# Patient Record
Sex: MALE | Race: OTHER | HISPANIC OR LATINO | Employment: FULL TIME | URBAN - METROPOLITAN AREA
[De-identification: names, ages, dates, MRNs, and addresses within clinical notes are randomized per-mention and may not be internally consistent; named-entity substitution may affect disease eponyms.]

---

## 2017-07-12 ENCOUNTER — TELEPHONE (OUTPATIENT)
Dept: SLEEP MEDICINE | Facility: CLINIC | Age: 27
End: 2017-07-12

## 2017-07-12 ENCOUNTER — OFFICE VISIT (OUTPATIENT)
Dept: SLEEP MEDICINE | Facility: CLINIC | Age: 27
End: 2017-07-12
Payer: COMMERCIAL

## 2017-07-12 ENCOUNTER — LAB VISIT (OUTPATIENT)
Dept: LAB | Facility: OTHER | Age: 27
End: 2017-07-12
Attending: NURSE PRACTITIONER
Payer: COMMERCIAL

## 2017-07-12 VITALS
HEART RATE: 68 BPM | BODY MASS INDEX: 41.75 KG/M2 | DIASTOLIC BLOOD PRESSURE: 90 MMHG | SYSTOLIC BLOOD PRESSURE: 150 MMHG | WEIGHT: 315 LBS | HEIGHT: 73 IN | OXYGEN SATURATION: 97 %

## 2017-07-12 DIAGNOSIS — R74.01 ELEVATED ALANINE AMINOTRANSFERASE (ALT) LEVEL: Primary | ICD-10-CM

## 2017-07-12 DIAGNOSIS — G47.33 OBSTRUCTIVE SLEEP APNEA: Primary | ICD-10-CM

## 2017-07-12 DIAGNOSIS — I10 ESSENTIAL HYPERTENSION: ICD-10-CM

## 2017-07-12 DIAGNOSIS — E66.01 OBESITY, MORBID, BMI 50 OR HIGHER: ICD-10-CM

## 2017-07-12 DIAGNOSIS — F17.200 CURRENT EVERY DAY SMOKER: ICD-10-CM

## 2017-07-12 DIAGNOSIS — I10 ESSENTIAL HYPERTENSION: Primary | ICD-10-CM

## 2017-07-12 DIAGNOSIS — R74.01 ELEVATED ALANINE AMINOTRANSFERASE (ALT) LEVEL: ICD-10-CM

## 2017-07-12 DIAGNOSIS — G47.33 OBSTRUCTIVE SLEEP APNEA: ICD-10-CM

## 2017-07-12 LAB
ALBUMIN SERPL BCP-MCNC: 3.7 G/DL
ALP SERPL-CCNC: 54 U/L
ALT SERPL W/O P-5'-P-CCNC: 69 U/L
ANION GAP SERPL CALC-SCNC: 9 MMOL/L
AST SERPL-CCNC: 34 U/L
BASOPHILS # BLD AUTO: 0.01 K/UL
BASOPHILS NFR BLD: 0.1 %
BILIRUB SERPL-MCNC: 0.8 MG/DL
BUN SERPL-MCNC: 13 MG/DL
CALCIUM SERPL-MCNC: 9 MG/DL
CHLORIDE SERPL-SCNC: 104 MMOL/L
CO2 SERPL-SCNC: 26 MMOL/L
CREAT SERPL-MCNC: 0.8 MG/DL
DIFFERENTIAL METHOD: NORMAL
EOSINOPHIL # BLD AUTO: 0.2 K/UL
EOSINOPHIL NFR BLD: 1.6 %
ERYTHROCYTE [DISTWIDTH] IN BLOOD BY AUTOMATED COUNT: 13 %
EST. GFR  (AFRICAN AMERICAN): >60 ML/MIN/1.73 M^2
EST. GFR  (NON AFRICAN AMERICAN): >60 ML/MIN/1.73 M^2
GLUCOSE SERPL-MCNC: 91 MG/DL
HCT VFR BLD AUTO: 45.7 %
HGB BLD-MCNC: 14.7 G/DL
LYMPHOCYTES # BLD AUTO: 2.5 K/UL
LYMPHOCYTES NFR BLD: 24.4 %
MCH RBC QN AUTO: 28.2 PG
MCHC RBC AUTO-ENTMCNC: 32.2 %
MCV RBC AUTO: 88 FL
MONOCYTES # BLD AUTO: 0.6 K/UL
MONOCYTES NFR BLD: 6 %
NEUTROPHILS # BLD AUTO: 7 K/UL
NEUTROPHILS NFR BLD: 67.6 %
PLATELET # BLD AUTO: 168 K/UL
PMV BLD AUTO: 10.7 FL
POTASSIUM SERPL-SCNC: 4.2 MMOL/L
PROT SERPL-MCNC: 7.3 G/DL
RBC # BLD AUTO: 5.22 M/UL
SODIUM SERPL-SCNC: 139 MMOL/L
TSH SERPL DL<=0.005 MIU/L-ACNC: 1.91 UIU/ML
WBC # BLD AUTO: 10.3 K/UL

## 2017-07-12 PROCEDURE — 99999 PR PBB SHADOW E&M-EST. PATIENT-LVL IV: CPT | Mod: PBBFAC,,, | Performed by: NURSE PRACTITIONER

## 2017-07-12 PROCEDURE — 84443 ASSAY THYROID STIM HORMONE: CPT

## 2017-07-12 PROCEDURE — 36415 COLL VENOUS BLD VENIPUNCTURE: CPT

## 2017-07-12 PROCEDURE — 85025 COMPLETE CBC W/AUTO DIFF WBC: CPT

## 2017-07-12 PROCEDURE — 80074 ACUTE HEPATITIS PANEL: CPT

## 2017-07-12 PROCEDURE — 80053 COMPREHEN METABOLIC PANEL: CPT

## 2017-07-12 PROCEDURE — 99205 OFFICE O/P NEW HI 60 MIN: CPT | Mod: S$GLB,,, | Performed by: NURSE PRACTITIONER

## 2017-07-12 RX ORDER — LISINOPRIL 20 MG/1
20 TABLET ORAL DAILY
Qty: 90 TABLET | Refills: 3 | Status: SHIPPED | OUTPATIENT
Start: 2017-07-12 | End: 2017-07-12 | Stop reason: SDUPTHER

## 2017-07-12 RX ORDER — LISINOPRIL 20 MG/1
20 TABLET ORAL DAILY
Qty: 30 TABLET | Refills: 0 | Status: SHIPPED | OUTPATIENT
Start: 2017-07-12 | End: 2018-02-01 | Stop reason: SDUPTHER

## 2017-07-12 NOTE — PROGRESS NOTES
Orville Erwin  was seen as a new patient, self-referred,  for the management of obstructive sleep apnea.    CHIEF COMPLAINT:    Chief Complaint   Patient presents with    Sleep Apnea       07/12/2017 SHAYNE Allen NP: HISTORY OF PRESENT ILLNESS: Orville Erwin is a 27 y.o. male is here for sleep evaluation.       Pt diagnosed with CHARLY in 2011 and has been using CPAP since.     Has breakthrough symptoms of disruptive snoring, excessive daytime sleepiness, excessive daytime fatigue, interrupted sleep, and morning headaches. Machine pressure does not feel sufficient.     CPAP Interrogation: Resmed Escape II Therapy hours: 2237 hours, Blower hours: 3488 CPAP 17 cm. No average hours information.     Reports weight gain since 2011 sleep study - 38 lb difference today's weight and 2011 weight.     Pt does not have PCP at this time, but is scheduled to see Dr. Cheng 7/24 to establish care. Has untreated uncontrolled hypertension. Has not seen doctor in 6 years.   Pt's BP is not controlled. Currently not taking any meds, but have previously been on BP meds; unable to recall which medication.  Pt denies cp/sob/ha/vision or neuro changes. Would like to resume therapy.     Denies symptoms of restless legs or kicking during sleep.    Occupation: works 3 jobs -  (on call only),  (few hours Fridays and Saturdays), and  full time    [place] Sleepiness Scale score during initial sleep evaluation was 15.    SLEEP ROUTINE:     Bed partner:  none  Time to bed:  midnight  Sleep onset latency:  Immediately         Disruptions or awakenings: 2 -3    Wakeup time:    4 am  Perceived sleep quality: poor        Baseline Sleep Study: 04/01/2011 Split night study 356 lb. The overall AHI was 107, worse in supine position and REM stage sleep, with an oxygen felipe of 25%. Supplemental oxygen was added at 2 liters/min during the diagnostic portion due to persistently low oxygen saturations that did  "not recover between CHARLY events.  Effective control of sleep disordered breathing was seen during supine REM stage sleep at a pressure of 17 cm of water.        PAST MEDICAL HISTORY:    Active Ambulatory Problems     Diagnosis Date Noted    No Active Ambulatory Problems     Resolved Ambulatory Problems     Diagnosis Date Noted    No Resolved Ambulatory Problems     Past Medical History:   Diagnosis Date    Hypertension     Obesity     Sleep apnea                 PAST SURGICAL HISTORY:    Past Surgical History:   Procedure Laterality Date    hypoxemia  2016    hospitalized for 4day    TONSILLECTOMY      as child         FAMILY HISTORY:                Family History   Problem Relation Age of Onset    Seizures Mother     Hypertension Father     Diabetes Paternal Grandmother     Constipation Daughter        SOCIAL HISTORY:          Tobacco:   History   Smoking Status    Current Every Day Smoker    Packs/day: 0.50    Types: Vaping with nicotine   Smokeless Tobacco    Former User       Alcohol use:    History   Alcohol Use No                 ALLERGIES:  Review of patient's allergies indicates:  No Known Allergies    CURRENT MEDICATIONS:    Current Outpatient Prescriptions   Medication Sig Dispense Refill    lisinopril (PRINIVIL,ZESTRIL) 20 MG tablet Take 1 tablet (20 mg total) by mouth once daily. 90 tablet 3     No current facility-administered medications for this visit.                   REVIEW OF SYSTEMS:     Sleep related symptoms as per HPI.  CONST:Reports weight gain    HEENT: Denies sinus congestion  PULM: Reports dyspnea  CARD:  Denies palpitations   GI:  Denies acid reflux  : Denies polyuria  NEURO: Reports headaches  PSYCH: Denies mood disturbance  HEME: Denies anemia    Otherwise, a balance of systems reviewed is negative.          PHYSICAL EXAM:  Vitals:    07/12/17 0750   BP: (!) 150/90   Pulse: 68   SpO2: 97%   Weight: (!) 179.1 kg (394 lb 13.5 oz)   Height: 6' 1" (1.854 m)   PainSc: 0-No " pain     Body mass index is 52.09 kg/m².     GENERAL: Obese body habitus, well groomed  HEENT:  Conjunctivae are non-erythematous; Pupils equal, round, and reactive to light; Nose is symmetrical; Nasal mucosa is pink and moist; Septum is midline; Inferior turbinates are normal; Nasal airflow is normal; Posterior pharynx is pink; Modified Mallampati: IV; Posterior palate is normal; Tonsils +1; Uvula is normal and pink;Tongue is normal; Dentition is fair; No TMJ tenderness; Jaw opening and protrusion without click and without discomfort.  NECK: Supple. No thyromegaly. No palpable nodes.    SKIN: On face and neck: No abrasions, no rashes, no lesions.  No subcutaneous nodules are palpable.  RESPIRATORY: Chest is clear to auscultation.  Normal chest expansion and non-labored breathing at rest.  CARDIOVASCULAR: Normal S1, S2.  No murmurs, gallops or rubs. No carotid bruits bilaterally.  EXTREMITIES: No edema. No clubbing. No cyanosis. Station normal. Gait normal.        NEURO/PSYCH: Oriented to time, place and person. Normal attention span and concentration. Affect is full. Mood is normal.                                              ASSESSMENT:    Obstructive sleep apnea, very severe by AHI. The patient symptomatically has breakthrough symptoms of disruptive snoring, excessive daytime sleepiness, excessive daytime fatigue, interrupted sleep, and morning headaches despite nightly use of CPAP with findings of crowded oral airway and elevated body mas index. Medical co-morbidities: uncontrolled HTN and obesity.  This warrants continued treatment for obstructive sleep apnea.  CPAP machine has reached reasonable useful lifetime and needs to be replaced.     Essential hypertension, chronic, uncontrolled    Morbid obesity, BMI 52.09, discussed relationship between CHARLY and weight    Current everyday smoker, vape with nicotine, not yet ready to quit    PLAN:    - CHARLY: new auto CPAP machine 15 - 20 cm; set up today at [facility]  at 10 am.  If patient has ongoing CHARLY symptoms despite CPAP adherence, then consider an in-lab titration sleep study in order to determine optimal fixed CPAP setting. RTC 6 weeks for CPAP adherence monitoring after set up.     -HTN : check labs today (CBC, CMP, TSH), start lisinopril 20 mg qd, and attend already scheduled appt to establish care with PCP: Dr. Cheng on 7/24 and re-check BP.  -Education: maintaining normal body weight (BMI 19 - 25)/ weight reduction. DASH diet. Low-sodium diet. Increase physical activity. Limit alcohol consumption. Smoking Cessation.     -Obesity: referral to Bariatric Medicine for weight reduction.  -Counseling regarding benefits of healthy eating and physical activity (150 minutes of moderate-intensity aerobic activity per week) for weight reduction which can improve overall health.      - Education: During our discussion today, we talked about the etiology of obstructive sleep apnea as well as the potential ramifications of untreated sleep apnea, which could include daytime sleepiness, hypertension, heart disease and/or stroke. We discussed potential treatment options, which could include weight loss, body positioning, continuous positive airway pressure (CPAP), or referral for surgical consideration.     - Precautions: The patient was advised to abstain from driving should they feel sleepy  or drowsy.

## 2017-07-12 NOTE — TELEPHONE ENCOUNTER
Holly, I have added a hepatitis panel to Mr. Erwin's lab from today. Please call the lab to add this on to today's specimen.

## 2017-07-12 NOTE — TELEPHONE ENCOUNTER
----- Message from Kirstin Yobani sent at 7/12/2017 11:44 AM CDT -----  x_  1st Request  _  2nd Request  _  3rd Request    Please refill the medication(s) listed below. Please call the patient when the prescription(s) is ready for  at the phone number (___)(___[phone]_____) . Pt stated that he would like his medication to be sent to another pharmacy. Information is below.    Medication #1lisinopril (PRINIVIL,ZESTRIL) 20 MG tablet 90 tablet 3     Medication #2      Preferred Pharmacy:  [facility]  [address] [place] [id number]

## 2017-07-12 NOTE — PATIENT INSTRUCTIONS
Your prescription for CPAP has been sent through our system. You should receive a call from [facility] in the next few days regarding your CPAP set up.     For any questions regarding your machine or supplies, please contact [facility] at [phone] (Ext [phone]).       To get CPAP machine today appointment at 10 am: For [facility]: CPAP and CPAP supplies    From Sleep Clinic: Take elevator to 2nd floor, take walkway to [facility], take [address] elevator to 7th floor; You will see sign for [facility]

## 2017-07-12 NOTE — TELEPHONE ENCOUNTER
Called [facility] to cancel pt order/ to transfer to [facility] and rep said that it was already sent to [facility] (the pt preferred pharmacy)    Pt was notified of Rx transfer.    Also called the lab to add on HEPATITIS PANEL to pt previous lab draw, spoke with 3 different people from [facility] lab they all said they did not see the order but when I called [facility] ([place] [phone]) they said they saw the order, but unable to assist me because the specimen was still at [facility].    [facility] lab suggested that NP changed order to lab collect where it says future so that they can used the order that they see but...unaccomplished.    It had already said lab collect somewhere on the order.

## 2017-07-13 ENCOUNTER — TELEPHONE (OUTPATIENT)
Dept: SLEEP MEDICINE | Facility: CLINIC | Age: 27
End: 2017-07-13

## 2017-07-13 LAB
HAV IGM SERPL QL IA: NEGATIVE
HBV CORE IGM SERPL QL IA: NEGATIVE
HBV SURFACE AG SERPL QL IA: NEGATIVE
HCV AB SERPL QL IA: NEGATIVE

## 2017-07-24 ENCOUNTER — LAB VISIT (OUTPATIENT)
Dept: LAB | Facility: OTHER | Age: 27
End: 2017-07-24
Attending: INTERNAL MEDICINE
Payer: COMMERCIAL

## 2017-07-24 ENCOUNTER — OFFICE VISIT (OUTPATIENT)
Dept: INTERNAL MEDICINE | Facility: CLINIC | Age: 27
End: 2017-07-24
Payer: COMMERCIAL

## 2017-07-24 VITALS
SYSTOLIC BLOOD PRESSURE: 130 MMHG | BODY MASS INDEX: 42.66 KG/M2 | HEART RATE: 80 BPM | WEIGHT: 315 LBS | HEIGHT: 72 IN | DIASTOLIC BLOOD PRESSURE: 72 MMHG | TEMPERATURE: 98 F | OXYGEN SATURATION: 97 %

## 2017-07-24 DIAGNOSIS — I10 ESSENTIAL HYPERTENSION: ICD-10-CM

## 2017-07-24 DIAGNOSIS — F17.200 NICOTINE USE DISORDER: ICD-10-CM

## 2017-07-24 DIAGNOSIS — M25.50 ARTHRALGIA, UNSPECIFIED JOINT: ICD-10-CM

## 2017-07-24 DIAGNOSIS — Z00.00 ANNUAL PHYSICAL EXAM: Primary | ICD-10-CM

## 2017-07-24 DIAGNOSIS — R51.9 RIGHT TEMPORAL HEADACHE: ICD-10-CM

## 2017-07-24 DIAGNOSIS — R79.89 ABNORMAL LFTS: ICD-10-CM

## 2017-07-24 PROBLEM — G47.33 OSA ON CPAP: Status: ACTIVE | Noted: 2017-07-24

## 2017-07-24 LAB
CCP AB SER IA-ACNC: <0.5 U/ML
CRP SERPL-MCNC: 13.5 MG/L
ERYTHROCYTE [SEDIMENTATION RATE] IN BLOOD BY WESTERGREN METHOD: 9 MM/HR
RHEUMATOID FACT SERPL-ACNC: <10 IU/ML

## 2017-07-24 PROCEDURE — 36415 COLL VENOUS BLD VENIPUNCTURE: CPT

## 2017-07-24 PROCEDURE — 99395 PREV VISIT EST AGE 18-39: CPT | Mod: S$GLB,,, | Performed by: INTERNAL MEDICINE

## 2017-07-24 PROCEDURE — 99999 PR PBB SHADOW E&M-EST. PATIENT-LVL IV: CPT | Mod: PBBFAC,,, | Performed by: INTERNAL MEDICINE

## 2017-07-24 PROCEDURE — 85651 RBC SED RATE NONAUTOMATED: CPT

## 2017-07-24 PROCEDURE — 86200 CCP ANTIBODY: CPT

## 2017-07-24 PROCEDURE — 84305 ASSAY OF SOMATOMEDIN: CPT

## 2017-07-24 PROCEDURE — 86431 RHEUMATOID FACTOR QUANT: CPT

## 2017-07-24 PROCEDURE — 86140 C-REACTIVE PROTEIN: CPT

## 2017-07-24 RX ORDER — IBUPROFEN 800 MG/1
800 TABLET ORAL 2 TIMES DAILY PRN
Qty: 30 TABLET | Refills: 0 | Status: SHIPPED | OUTPATIENT
Start: 2017-07-24 | End: 2018-05-08 | Stop reason: SDUPTHER

## 2017-07-24 NOTE — LETTER
July 24, 2017    Orville Erwin  [address] [place] [address]         [facility]  [facility]  [place] [address]  Phone: [phone]  Fax: [phone] July 24, 2017     Patient: Orville Erwin   YOB: 1990   Date of Visit: 7/24/2017       To Whom It May Concern:    Orville Erwin was seen in our office today.  .    If you have any questions or concerns, please don't hesitate to call.    Sincerely,        Erik Cheng MD

## 2017-07-24 NOTE — PROGRESS NOTES
Subjective:       Patient ID: Orville Erwin is a 27 y.o. male.    Chief Complaint: Annual Exam      New pt.     Here for physical exam.    Feeling well today.  No c/o.     Pt says 'I have been big my whole life.'  He is interested in losing weight but feels unmotivated.      ROS  -Occasional HA.  R temporal, 'piercing,' 2-3x/wk x 6 mos.  These improve w/Tylenol or sleep.  No photophobia or nausea.    -Elbows, shoulders and knees and back all feel very stiff each morning upon waking.  These feel better gradually after he gets moving around.  This has been present for approx 1yr.            Review of Systems   Constitutional: Negative.    HENT: Negative.    Eyes: Negative.    Respiratory: Negative.        Past Medical History:   Diagnosis Date    Hypertension     Obesity     Sleep apnea        Past Surgical History:   Procedure Laterality Date    hypoxemia  2016    hospitalized for 4day    TONSILLECTOMY      as child       Family History   Problem Relation Age of Onset    Seizures Mother     Hypertension Father     Diabetes Paternal Grandmother     Constipation Daughter     Alzheimer's disease Maternal Grandmother     Prostate cancer Maternal Grandfather        Social History     Social History    Marital status: Single     Spouse name: N/A    Number of children: N/A    Years of education: N/A     Occupational History          Social History Main Topics    Smoking status: Current Every Day Smoker     Packs/day: 0.50     Types: Vaping with nicotine    Smokeless tobacco: Former User    Alcohol use No    Drug use: No    Sexual activity: Yes     Partners: Female     Other Topics Concern    None     Social History Narrative    Lives w/wife and 1 child.       Objective:       Vitals:    07/24/17 1306   BP: 130/72   Pulse: 80   Temp: 97.7 °F (36.5 °C)   SpO2: 97%   Weight: (!) 179.5 kg (395 lb 11.6 oz)   Height: 6' (1.829 m)     Physical Exam   Constitutional: He is oriented to person,  place, and time. He appears well-developed and well-nourished. No distress.   ?frontal bossing   HENT:   Head: Normocephalic and atraumatic.   Right Ear: Tympanic membrane, external ear and ear canal normal.   Left Ear: Tympanic membrane, external ear and ear canal normal.   Mouth/Throat: Oropharynx is clear and moist and mucous membranes are normal. No oropharyngeal exudate or posterior oropharyngeal erythema.   Eyes: Conjunctivae and EOM are normal. Pupils are equal, round, and reactive to light.   Neck: Normal range of motion. Neck supple. No thyromegaly present.   Cardiovascular: Normal rate, regular rhythm and normal heart sounds.  Exam reveals no gallop and no friction rub.    No murmur heard.  Pulmonary/Chest: Effort normal and breath sounds normal. No respiratory distress. He has no wheezes. He has no rhonchi. He has no rales.   Lymphadenopathy:     He has no cervical adenopathy.   Neurological: He is alert and oriented to person, place, and time.   Skin: He is not diaphoretic.       Assessment:       1. Annual physical exam    2. Essential hypertension    3. Abnormal LFTs    4. BMI 50.0-59.9, adult    5. Nicotine use disorder    6. Right temporal headache    7. Arthralgia, unspecified joint        Plan:           HM - Update labs.    HTN - At goal.  Cont current tx.    Abnormal LFTs - I suspect NAFLD.  Repeat LFTs and check HBsAb.  Get U/S.      Obesity - Pt referred to [facility].    Nicotine - Pt advised to quit.  Pt aware of risks.  Pt not ready to quit.      HAs - Possibly migraine.  May also be 2/2 CHARLY.  Pt just got new CPAP supplies today.  Will see if HAs improve w/these.  Pt also was given ibu 800 for PRN use.    Athralgias - Will test for RA, acromegaly.

## 2017-07-26 LAB
IGF-I SERPL-MCNC: 152 NG/ML (ref 66–346)
IGF-I Z-SCORE SERPL: -0.39 SD

## 2017-08-01 ENCOUNTER — OFFICE VISIT (OUTPATIENT)
Dept: URGENT CARE | Facility: CLINIC | Age: 27
End: 2017-08-01
Payer: COMMERCIAL

## 2017-08-01 VITALS
WEIGHT: 315 LBS | HEIGHT: 73 IN | SYSTOLIC BLOOD PRESSURE: 131 MMHG | OXYGEN SATURATION: 98 % | DIASTOLIC BLOOD PRESSURE: 78 MMHG | TEMPERATURE: 98 F | BODY MASS INDEX: 41.75 KG/M2 | RESPIRATION RATE: 18 BRPM | HEART RATE: 102 BPM

## 2017-08-01 DIAGNOSIS — J01.40 ACUTE NON-RECURRENT PANSINUSITIS: Primary | ICD-10-CM

## 2017-08-01 DIAGNOSIS — R05.9 COUGH: ICD-10-CM

## 2017-08-01 PROCEDURE — 99203 OFFICE O/P NEW LOW 30 MIN: CPT | Mod: 25,S$GLB,, | Performed by: SURGERY

## 2017-08-01 PROCEDURE — 96372 THER/PROPH/DIAG INJ SC/IM: CPT | Mod: S$GLB,,, | Performed by: SURGERY

## 2017-08-01 RX ORDER — AZELASTINE 1 MG/ML
2 SPRAY, METERED NASAL 2 TIMES DAILY
Qty: 30 ML | Refills: 2 | Status: SHIPPED | OUTPATIENT
Start: 2017-08-01 | End: 2017-09-22

## 2017-08-01 RX ORDER — PREDNISONE 20 MG/1
20 TABLET ORAL 2 TIMES DAILY
Qty: 10 TABLET | Refills: 0 | Status: SHIPPED | OUTPATIENT
Start: 2017-08-01 | End: 2017-08-06

## 2017-08-01 RX ORDER — CEFUROXIME AXETIL 500 MG/1
500 TABLET ORAL 2 TIMES DAILY
Qty: 20 TABLET | Refills: 0 | Status: SHIPPED | OUTPATIENT
Start: 2017-08-01 | End: 2017-08-11

## 2017-08-01 RX ORDER — BETAMETHASONE SODIUM PHOSPHATE AND BETAMETHASONE ACETATE 3; 3 MG/ML; MG/ML
9 INJECTION, SUSPENSION INTRA-ARTICULAR; INTRALESIONAL; INTRAMUSCULAR; SOFT TISSUE ONCE
Status: COMPLETED | OUTPATIENT
Start: 2017-08-01 | End: 2017-08-01

## 2017-08-01 RX ORDER — PROMETHAZINE HYDROCHLORIDE AND CODEINE PHOSPHATE 6.25; 1 MG/5ML; MG/5ML
5 SOLUTION ORAL EVERY 4 HOURS PRN
Qty: 120 ML | Refills: 0 | Status: SHIPPED | OUTPATIENT
Start: 2017-08-01 | End: 2017-08-06

## 2017-08-01 RX ADMIN — BETAMETHASONE SODIUM PHOSPHATE AND BETAMETHASONE ACETATE 9 MG: 3; 3 INJECTION, SUSPENSION INTRA-ARTICULAR; INTRALESIONAL; INTRAMUSCULAR; SOFT TISSUE at 11:08

## 2017-08-01 NOTE — LETTER
August 1, 2017      [facility] [address], [address]  [place] [address]  Phone: [phone]  Fax: [phone]       Patient: Orville Erwin   YOB: 1990  Date of Visit: 08/01/2017    To Whom It May Concern:    Orville Rogel was at [facility] on 08/01/2017. He may return to work/school on 8/2/2017 with no restrictions. If you have any questions or concerns, or if I can be of further assistance, please do not hesitate to contact me.    Sincerely,    Priyanka Jeff MD

## 2017-08-01 NOTE — PATIENT INSTRUCTIONS
Sinusitis (Antibiotic Treatment)    The sinuses are air-filled spaces within the bones of the face. They connect to the inside of the nose. Sinusitis is an inflammation of the tissue lining the sinus cavity. Sinus inflammation can occur during a cold. It can also be due to allergies to pollens and other particles in the air. Sinusitis can cause symptoms of sinus congestion and fullness. A sinus infection causes fever, headache and facial pain. There is often green or yellow drainage from the nose or into the back of the throat (post-nasal drip). You have been given antibiotics to treat this condition.  Home care:  · Take the full course of antibiotics as instructed. Do not stop taking them, even if you feel better.  · Drink plenty of water, hot tea, and other liquids. This may help thin mucus. It also may promote sinus drainage.  · Heat may help soothe painful areas of the face. Use a towel soaked in hot water. Or,  the shower and direct the hot spray onto your face. Using a vaporizer along with a menthol rub at night may also help.   · An expectorant containing guaifenesin may help thin the mucus and promote drainage from the sinuses.  · Over-the-counter decongestants may be used unless a similar medicine was prescribed. Nasal sprays work the fastest. Use one that contains phenylephrine or oxymetazoline. First blow the nose gently. Then use the spray. Do not use these medicines more often than directed on the label or symptoms may get worse. You may also use tablets containing pseudoephedrine. Avoid products that combine ingredients, because side effects may be increased. Read labels. You can also ask the pharmacist for help. (NOTE: Persons with high blood pressure should not use decongestants. They can raise blood pressure.)  · Over-the-counter antihistamines may help if allergies contributed to your sinusitis.    · Do not use nasal rinses or irrigation during an acute sinus infection, unless told to by  your health care provider. Rinsing may spread the infection to other sinuses.  · Use acetaminophen or ibuprofen to control pain, unless another pain medicine was prescribed. (If you have chronic liver or kidney disease or ever had a stomach ulcer, talk with your doctor before using these medicines. Aspirin should never be used in anyone under 18 years of age who is ill with a fever. It may cause severe liver damage.)  · Don't smoke. This can worsen symptoms.  Follow-up care  Follow up with your healthcare provider or our staff if you are not improving within the next week.  When to seek medical advice  Call your healthcare provider if any of these occur:  · Facial pain or headache becoming more severe  · Stiff neck  · Unusual drowsiness or confusion  · Swelling of the forehead or eyelids  · Vision problems, including blurred or double vision  · Fever of 100.4ºF (38ºC) or higher, or as directed by your healthcare provider  · Seizure  · Breathing problems  · Symptoms not resolving within 10 days  Date Last Reviewed: 4/13/2015 © 2000-2016 [employer]. [address] [place] [address]. All rights reserved. This information is not intended as a substitute for professional medical care. Always follow your healthcare professional's instructions.      Please drink plenty of fluids.  Please get plenty of rest.  Please return here or go to the Emergency Department for any concerns or worsening of condition.  If you were given wait & see antibiotics, please wait 3-5 days before taking them, and only take them if your symptoms have worsened or not improved.  If you do begin taking the antibiotics, please take them to completion.  If you were prescribed antibiotics, please take them to completion.  If you were prescribed a narcotic medication, do not drive or operate heavy equipment or machinery while taking these medications.  If you do not have Hypertension or any history of palpitations, it is ok to  take over the counter Sudafed or Mucinex D or Allegra-D or Claritin-D or Zyrtec-D.  If you do take one of the above, it is ok to combine that with plain over the counter Mucinex or Allegra or Claritin or Zyrtec.  If for example you are taking Zyrtec -D, you can combine that with Mucinex, but not Mucinex-D.  If you are taking Mucinex-D, you can combine that with plain Allegra or Claritin or Zyrtec.   If you do have Hypertension or palpitations, it is safe to take Coricidin HBP for relief of sinus symptoms.  We recommend you take over the counter Flonase (Fluticasone) or another nasally inhaled steroid unless you are already taking one.  Nasal irrigation with a saline spray or Netti Pot like device per their directions is also recommended.  If not allergic, please take over the counter Tylenol (Acetaminophen) and/or Motrin (Ibuprofen) as directed for control of pain and/or fever.  Please follow up with your primary care doctor or specialist as needed.    If you  smoke, please stop smoking.

## 2017-08-01 NOTE — PROGRESS NOTES
"Subjective:       Patient ID: Orville Erwin is a 27 y.o. male.    Vitals:  height is 6' 1" (1.854 m) and weight is 179.2 kg (395 lb) (abnormal). His temperature is 98.3 °F (36.8 °C). His blood pressure is 131/78 and his pulse is 102. His respiration is 18 and oxygen saturation is 98%.     Chief Complaint: Cough and Sore Throat    Cough   This is a new problem. The current episode started in the past 7 days. The problem has been gradually worsening. The problem occurs constantly. The cough is productive of sputum. Associated symptoms include headaches, nasal congestion, postnasal drip, rhinorrhea and a sore throat. Pertinent negatives include no chest pain, chills, ear pain, eye redness, fever, myalgias, shortness of breath or wheezing. Nothing aggravates the symptoms. He has tried nothing for the symptoms. The treatment provided no relief.   Sore Throat    Associated symptoms include congestion, coughing and headaches. Pertinent negatives include no abdominal pain, ear pain, hoarse voice or shortness of breath.     Review of Systems   Constitution: Negative for chills, fever and malaise/fatigue.   HENT: Positive for congestion, headaches, postnasal drip, rhinorrhea and sore throat. Negative for ear pain and hoarse voice.    Eyes: Negative for discharge and redness.   Cardiovascular: Negative for chest pain, dyspnea on exertion and leg swelling.   Respiratory: Positive for cough and sputum production. Negative for shortness of breath and wheezing.    Musculoskeletal: Negative for myalgias.   Gastrointestinal: Negative for abdominal pain and nausea.       Objective:      Physical Exam   Constitutional: He is oriented to person, place, and time. He appears well-developed and well-nourished. He is cooperative.  Non-toxic appearance. He does not appear ill. No distress.   HENT:   Head: Normocephalic and atraumatic.   Right Ear: Hearing, tympanic membrane, external ear and ear canal normal.   Left Ear: Hearing, " tympanic membrane, external ear and ear canal normal.   Nose: Mucosal edema and rhinorrhea present. No nasal deformity. No epistaxis. Right sinus exhibits maxillary sinus tenderness and frontal sinus tenderness. Left sinus exhibits maxillary sinus tenderness and frontal sinus tenderness.   Mouth/Throat: Uvula is midline and mucous membranes are normal. No trismus in the jaw. Normal dentition. No uvula swelling. Posterior oropharyngeal edema and posterior oropharyngeal erythema present.   Purulent nasal discharge bilateral   Eyes: Conjunctivae and lids are normal. No scleral icterus.   Sclera clear bilat   Neck: Trachea normal, full passive range of motion without pain and phonation normal. Neck supple.   Cardiovascular: Normal rate, regular rhythm, normal heart sounds, intact distal pulses and normal pulses.    Pulmonary/Chest: Effort normal and breath sounds normal. No respiratory distress.   Frequent dry cough   Abdominal: Soft. Normal appearance and bowel sounds are normal. He exhibits no distension. There is no tenderness.   Musculoskeletal: Normal range of motion. He exhibits no edema or deformity.   Neurological: He is alert and oriented to person, place, and time. He exhibits normal muscle tone. Coordination normal.   Skin: Skin is warm, dry and intact. He is not diaphoretic. No pallor.   Psychiatric: He has a normal mood and affect. His speech is normal and behavior is normal. Judgment and thought content normal. Cognition and memory are normal.   Nursing note and vitals reviewed.      Assessment:       1. Acute non-recurrent pansinusitis    2. Cough        Plan:         Acute non-recurrent pansinusitis  -     betamethasone acetate-betamethasone sodium phosphate injection 9 mg; Inject 1.5 mLs (9 mg total) into the muscle once.  -     predniSONE (DELTASONE) 20 MG tablet; Take 1 tablet (20 mg total) by mouth 2 (two) times daily.  Dispense: 10 tablet; Refill: 0  -     cefUROXime (CEFTIN) 500 MG tablet; Take 1  tablet (500 mg total) by mouth 2 (two) times daily.  Dispense: 20 tablet; Refill: 0  -     promethazine-codeine 6.25-10 mg/5 ml (PHENERGAN WITH CODEINE) 6.25-10 mg/5 mL syrup; Take 5 mLs by mouth every 4 (four) hours as needed for Cough.  Dispense: 120 mL; Refill: 0  -     azelastine (ASTELIN) 137 mcg (0.1 %) nasal spray; 2 sprays (274 mcg total) by Nasal route 2 (two) times daily.  Dispense: 30 mL; Refill: 2    Cough        See patient education handouts.      Work note written

## 2017-09-21 DIAGNOSIS — R05.9 COUGH: Primary | ICD-10-CM

## 2017-09-22 ENCOUNTER — HOSPITAL ENCOUNTER (OUTPATIENT)
Dept: PULMONOLOGY | Facility: CLINIC | Age: 27
Discharge: HOME | End: 2017-09-22
Payer: COMMERCIAL

## 2017-09-22 ENCOUNTER — OFFICE VISIT (OUTPATIENT)
Dept: PULMONOLOGY | Facility: CLINIC | Age: 27
End: 2017-09-22
Payer: COMMERCIAL

## 2017-09-22 ENCOUNTER — HOSPITAL ENCOUNTER (OUTPATIENT)
Dept: RADIOLOGY | Facility: HOSPITAL | Age: 27
Discharge: HOME | End: 2017-09-22
Attending: NURSE PRACTITIONER
Payer: COMMERCIAL

## 2017-09-22 VITALS
HEART RATE: 76 BPM | OXYGEN SATURATION: 96 % | WEIGHT: 315 LBS | DIASTOLIC BLOOD PRESSURE: 98 MMHG | BODY MASS INDEX: 44.1 KG/M2 | SYSTOLIC BLOOD PRESSURE: 142 MMHG | HEIGHT: 71 IN

## 2017-09-22 DIAGNOSIS — G47.33 OSA ON CPAP: ICD-10-CM

## 2017-09-22 DIAGNOSIS — R05.9 COUGH: ICD-10-CM

## 2017-09-22 DIAGNOSIS — F17.200 NICOTINE USE DISORDER: Primary | ICD-10-CM

## 2017-09-22 DIAGNOSIS — J45.20 MILD INTERMITTENT ASTHMA IN ADULT WITHOUT COMPLICATION: ICD-10-CM

## 2017-09-22 LAB
POST FEV1 FVC: 0.76
POST FEV1: 2.94
POST FVC: 3.86
PRE FEV1 FVC: 73
PRE FEV1: 2.63
PRE FVC: 3.58
PREDICTED FEV1 FVC: 84
PREDICTED FEV1: 4.84
PREDICTED FVC: 5.6

## 2017-09-22 PROCEDURE — 94060 EVALUATION OF WHEEZING: CPT | Mod: S$GLB,,, | Performed by: INTERNAL MEDICINE

## 2017-09-22 PROCEDURE — 71020 XR CHEST PA AND LATERAL: CPT | Mod: TC

## 2017-09-22 PROCEDURE — 99999 PR PBB SHADOW E&M-EST. PATIENT-LVL III: CPT | Mod: PBBFAC,,, | Performed by: NURSE PRACTITIONER

## 2017-09-22 PROCEDURE — 99214 OFFICE O/P EST MOD 30 MIN: CPT | Mod: 25,S$GLB,, | Performed by: NURSE PRACTITIONER

## 2017-09-22 PROCEDURE — 94727 GAS DIL/WSHOT DETER LNG VOL: CPT | Mod: 51,S$GLB,, | Performed by: INTERNAL MEDICINE

## 2017-09-22 PROCEDURE — 3008F BODY MASS INDEX DOCD: CPT | Mod: S$GLB,,, | Performed by: NURSE PRACTITIONER

## 2017-09-22 PROCEDURE — 71020 XR CHEST PA AND LATERAL: CPT | Mod: 26,,, | Performed by: RADIOLOGY

## 2017-09-22 PROCEDURE — 94729 DIFFUSING CAPACITY: CPT | Mod: S$GLB,,, | Performed by: INTERNAL MEDICINE

## 2017-09-22 RX ORDER — ALBUTEROL SULFATE 90 UG/1
2 AEROSOL, METERED RESPIRATORY (INHALATION) EVERY 4 HOURS PRN
Qty: 18 G | Refills: 3 | Status: SHIPPED | OUTPATIENT
Start: 2017-09-22 | End: 2018-05-08 | Stop reason: SDUPTHER

## 2017-09-22 RX ORDER — FLUTICASONE PROPIONATE 50 MCG
2 SPRAY, SUSPENSION (ML) NASAL DAILY
Qty: 1 BOTTLE | Refills: 3 | Status: SHIPPED | OUTPATIENT
Start: 2017-09-22 | End: 2018-05-09 | Stop reason: SDUPTHER

## 2017-09-22 RX ORDER — FLUTICASONE FUROATE AND VILANTEROL 200; 25 UG/1; UG/1
1 POWDER RESPIRATORY (INHALATION) DAILY
Qty: 1 EACH | Refills: 5 | Status: SHIPPED | OUTPATIENT
Start: 2017-09-22 | End: 2018-05-09

## 2017-09-22 RX ORDER — MONTELUKAST SODIUM 10 MG/1
10 TABLET ORAL NIGHTLY
Qty: 30 TABLET | Refills: 3 | Status: SHIPPED | OUTPATIENT
Start: 2017-09-22 | End: 2017-10-22

## 2017-09-22 NOTE — PROGRESS NOTES
Subjective:       Patient ID: Orville Erwin is a 27 y.o. male.    Chief Complaint: Abnormal PFTs    HPI  Orville Erwin is a 27 y.o. male who presents today as new patient for pulmonary evaluation work clearance. His medical hx includes HTN, obesity, and obstructive sleep apnea (compliant with CPAP therapy). He has a hx of asthma dx as a child but has not been symptomatic. He was on a daily controller as a child but did not have a need as he grew older. He has seasonal allergic rhinitis with post nasal drainage with associated non productive cough. He uses zyrtec and it helps. He had an abnormal lung function at work and is here for clearance.  He has a 5 pack year smoking history but stopped smoking cigarettes and started to use a vape (occasionally). No family hx lung cancer or disease. 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 He denies shortness of breath, wheezing, BROWN, chest pain, fever, or chills.    Review of patient's allergies indicates:  No Known Allergies  Past Medical History:   Diagnosis Date    Hypertension     Obesity     Sleep apnea      Past Surgical History:   Procedure Laterality Date    hypoxemia  2016    hospitalized for 4day    TONSILLECTOMY      as child     Family History     Problem Relation (Age of Onset)    Alzheimer's disease Maternal Grandmother    Constipation Daughter    Diabetes Paternal Grandmother    Hypertension Father    Prostate cancer Maternal Grandfather    Seizures Mother        Social History Main Topics    Smoking status: Current Every Day Smoker     Packs/day: 0.50     Types: Vaping with nicotine    Smokeless tobacco: Former User    Alcohol use No    Drug use: No    Sexual activity: Yes     Partners: Female       Review of Systems   Constitutional: Negative for fever, chills and weight loss.   HENT: Positive for postnasal drip. Negative for trouble swallowing.    Eyes: Positive for itching.  "  Respiratory: Positive for cough. Negative for sputum production, shortness of breath and use of rescue inhaler.    Cardiovascular: Negative for chest pain and leg swelling.   Genitourinary: Negative for difficulty urinating.   Musculoskeletal: Negative for gait problem.   Skin: Negative for rash.   Gastrointestinal: Negative for acid reflux.   Neurological: Negative for headaches.   Psychiatric/Behavioral: Negative for sleep disturbance.       Objective:       Vitals:    09/22/17 1313   BP: (!) 142/98   Pulse: 76   SpO2: 96%   Weight: (!) 173.7 kg (383 lb)   Height: 5' 11" (1.803 m)     Physical Exam   Constitutional: He is oriented to person, place, and time. He appears well-developed. No distress. He is obese.   HENT:   Head: Normocephalic.   Cardiovascular: Normal rate, regular rhythm, normal heart sounds and intact distal pulses.    Pulmonary/Chest: Normal expansion, symmetric chest wall expansion, effort normal and breath sounds normal. No respiratory distress. He has no wheezes. He has no rhonchi. He has no rales.   Musculoskeletal: Normal range of motion. He exhibits no edema.   Lymphadenopathy: No supraclavicular adenopathy is present.     He has no cervical adenopathy.   Neurological: He is alert and oriented to person, place, and time.   Skin: Skin is warm and dry.   Psychiatric: He has a normal mood and affect.   Vitals reviewed.    Personal Diagnostic Review    PFT's reviewed 9/22/17:  small airway obstruction with significant improvement post bronchodilator. Normal DLCO  CXR reviewed 9/22/17: lung clear, no effusions  Assessment:          Outpatient Encounter Prescriptions as of 9/22/2017   Medication Sig Dispense Refill    lisinopril (PRINIVIL,ZESTRIL) 20 MG tablet Take 1 tablet (20 mg total) by mouth once daily. 30 tablet 0    albuterol 90 mcg/actuation inhaler Inhale 2 puffs into the lungs every 4 (four) hours as needed for Wheezing or Shortness of Breath. Rescue 18 g 3    fluticasone (FLONASE) " 50 mcg/actuation nasal spray 2 sprays by Each Nare route once daily. 1 Bottle 3    fluticasone-vilanterol (BREO ELLIPTA) 200-25 mcg/dose DsDv diskus inhaler Inhale 1 puff into the lungs once daily. Controller 1 each 5    ibuprofen (ADVIL,MOTRIN) 800 MG tablet Take 1 tablet (800 mg total) by mouth 2 (two) times daily as needed (headache). 30 tablet 0    montelukast (SINGULAIR) 10 mg tablet Take 1 tablet (10 mg total) by mouth every evening. 30 tablet 3    [DISCONTINUED] azelastine (ASTELIN) 137 mcg (0.1 %) nasal spray 2 sprays (274 mcg total) by Nasal route 2 (two) times daily. 30 mL 2     No facility-administered encounter medications on file as of 9/22/2017.      Problem List Items Addressed This Visit        Pulmonary    CHARLY on CPAP       Endocrine    BMI 50.0-59.9, adult       Other    Nicotine use disorder - Primary      Other Visit Diagnoses     Mild intermittent asthma in adult without complication              Plan:       · Upon review of pulmonary function studies and clinical history patient appears to have mild intermittent asthma which shows significant response to bronchodilators. Patient is asymptomatic however will place on daily  as a preventative.   · Take albuterol for rescue and/or prevention  · Advised to stop use of nicotine products such as cigarettes, vapes, ect.  Follow up in 3 months for asthma  Work clearance letter faxed today to work place [phone] and sent to patient in mail.  Contact prior to if any issues or concerns should arise.   Patient verbalized understanding of all information, instruction, education, recommendations provided and agrees with plan of care.

## 2017-09-22 NOTE — PATIENT INSTRUCTIONS
· Take Breo 1 puff daily and rinse mouth after every use.  · Take albuterol 2 puffs every 4 hours as needed for rescue or prevention  Nasal Saline:  Use distilled water only.  Be sure to tilt head forward as shown in picture.Squirt into nostril 2-3 times until you taste saline in back of throat. Spit, and blow nose. Do this 4 times, alternating right and left nostril. Do this routine 2 times per day.      Gargle with warm salt water 2 times per day.   Take singulair daily  Flonase 2 sprays each nostril daily  Continue all of these things for 2 weeks  Follow up in 3 months  Contact prior to if any issues or concerns should arise.

## 2017-09-27 ENCOUNTER — TELEPHONE (OUTPATIENT)
Dept: PULMONOLOGY | Facility: CLINIC | Age: 27
End: 2017-09-27

## 2017-09-27 NOTE — TELEPHONE ENCOUNTER
Patient presented in [facility] today stating he needed to see me today with regards to his pulmonary clinic letter. There was no appointment slots available, therefore I called patient and he stated he needed his clearance letter stating he passed his breathing studies. I informed patient that I faxed his letter for no work restriction to the number provided as well as the MA and mailed a copy to him on Friday. I printed and signed a copy of the letter today and the MA presented to him.

## 2017-11-28 DIAGNOSIS — I10 ESSENTIAL HYPERTENSION: ICD-10-CM

## 2017-11-29 RX ORDER — LISINOPRIL 20 MG/1
TABLET ORAL
Qty: 90 TABLET | Refills: 0 | OUTPATIENT
Start: 2017-11-29

## 2018-02-01 ENCOUNTER — LAB VISIT (OUTPATIENT)
Dept: LAB | Facility: HOSPITAL | Age: 28
End: 2018-02-01
Attending: INTERNAL MEDICINE
Payer: COMMERCIAL

## 2018-02-01 ENCOUNTER — OFFICE VISIT (OUTPATIENT)
Dept: INTERNAL MEDICINE | Facility: CLINIC | Age: 28
End: 2018-02-01
Payer: COMMERCIAL

## 2018-02-01 VITALS
HEIGHT: 73 IN | HEART RATE: 62 BPM | DIASTOLIC BLOOD PRESSURE: 100 MMHG | WEIGHT: 315 LBS | BODY MASS INDEX: 41.75 KG/M2 | SYSTOLIC BLOOD PRESSURE: 199 MMHG

## 2018-02-01 DIAGNOSIS — R10.13 EPIGASTRIC ABDOMINAL PAIN: Primary | ICD-10-CM

## 2018-02-01 DIAGNOSIS — I10 ESSENTIAL HYPERTENSION: ICD-10-CM

## 2018-02-01 DIAGNOSIS — R10.13 EPIGASTRIC ABDOMINAL PAIN: ICD-10-CM

## 2018-02-01 LAB
ANION GAP SERPL CALC-SCNC: 10 MMOL/L
BASOPHILS # BLD AUTO: 0.02 K/UL
BASOPHILS NFR BLD: 0.2 %
BUN SERPL-MCNC: 7 MG/DL
CALCIUM SERPL-MCNC: 9.1 MG/DL
CHLORIDE SERPL-SCNC: 104 MMOL/L
CO2 SERPL-SCNC: 24 MMOL/L
CREAT SERPL-MCNC: 0.8 MG/DL
DIFFERENTIAL METHOD: ABNORMAL
EOSINOPHIL # BLD AUTO: 0.1 K/UL
EOSINOPHIL NFR BLD: 1 %
ERYTHROCYTE [DISTWIDTH] IN BLOOD BY AUTOMATED COUNT: 12.8 %
EST. GFR  (AFRICAN AMERICAN): >60 ML/MIN/1.73 M^2
EST. GFR  (NON AFRICAN AMERICAN): >60 ML/MIN/1.73 M^2
GLUCOSE SERPL-MCNC: 163 MG/DL
HCT VFR BLD AUTO: 44.8 %
HGB BLD-MCNC: 14.7 G/DL
LYMPHOCYTES # BLD AUTO: 2.2 K/UL
LYMPHOCYTES NFR BLD: 18.8 %
MCH RBC QN AUTO: 28.1 PG
MCHC RBC AUTO-ENTMCNC: 32.8 G/DL
MCV RBC AUTO: 86 FL
MONOCYTES # BLD AUTO: 0.5 K/UL
MONOCYTES NFR BLD: 4.6 %
NEUTROPHILS # BLD AUTO: 8.7 K/UL
NEUTROPHILS NFR BLD: 75.1 %
PLATELET # BLD AUTO: 178 K/UL
PMV BLD AUTO: 11.4 FL
POTASSIUM SERPL-SCNC: 4 MMOL/L
RBC # BLD AUTO: 5.23 M/UL
SODIUM SERPL-SCNC: 138 MMOL/L
WBC # BLD AUTO: 11.6 K/UL

## 2018-02-01 PROCEDURE — 85025 COMPLETE CBC W/AUTO DIFF WBC: CPT

## 2018-02-01 PROCEDURE — 3008F BODY MASS INDEX DOCD: CPT | Mod: S$GLB,,, | Performed by: INTERNAL MEDICINE

## 2018-02-01 PROCEDURE — 99999 PR PBB SHADOW E&M-EST. PATIENT-LVL III: CPT | Mod: PBBFAC,,, | Performed by: INTERNAL MEDICINE

## 2018-02-01 PROCEDURE — 80048 BASIC METABOLIC PNL TOTAL CA: CPT

## 2018-02-01 PROCEDURE — 99213 OFFICE O/P EST LOW 20 MIN: CPT | Mod: S$GLB,,, | Performed by: INTERNAL MEDICINE

## 2018-02-01 PROCEDURE — 36415 COLL VENOUS BLD VENIPUNCTURE: CPT

## 2018-02-01 RX ORDER — LISINOPRIL 20 MG/1
20 TABLET ORAL DAILY
Qty: 30 TABLET | Refills: 0 | Status: SHIPPED | OUTPATIENT
Start: 2018-02-01 | End: 2020-03-02

## 2018-02-01 NOTE — LETTER
February 1, 2018    Orville Erwin  [address] [place] [address]          [facility]  [address]  [place] [address]  Phone: [phone]  Fax: [phone] February 1, 2018     Patient: Orville Erwin   YOB: 1990   Date of Visit: 2/1/2018       To Whom It May Concern:    It is my medical opinion that Orville Erwin may return to work on 2/2/18.  Was off due to medical conditions from 1/30/18 unitl 2/2..    If you have any questions or concerns, please don't hesitate to call.    Sincerely,        German Dsouza MD

## 2018-02-01 NOTE — PROGRESS NOTES
Clinic Note  2/1/2018      Subjective:       Patient ID:  Orville is a 27 y.o. male being seen for an urgent care visit.    Chief Complaint: Follow-up (er ) and Abdominal Pain    Abdominal Pain   This is a new problem. The current episode started yesterday. The onset quality is sudden. The problem occurs intermittently. The problem has been waxing and waning. The pain is located in the epigastric region. The pain is mild. The quality of the pain is cramping. The abdominal pain does not radiate. Associated symptoms include nausea and vomiting (once). Pertinent negatives include no constipation, diarrhea or fever. The pain is aggravated by eating.       Review of Systems   Constitutional: Negative for fever.   Gastrointestinal: Positive for abdominal pain, nausea and vomiting (once). Negative for constipation and diarrhea.       Medication List with Changes/Refills   New Medications    DICYCLOMINE HCL (GI COCKTAIL SIMPLE RECORD)    Take 15 mLs by mouth every 8 (eight) hours as needed.   Current Medications    ALBUTEROL 90 MCG/ACTUATION INHALER    Inhale 2 puffs into the lungs every 4 (four) hours as needed for Wheezing or Shortness of Breath. Rescue    FLUTICASONE (FLONASE) 50 MCG/ACTUATION NASAL SPRAY    2 sprays by Each Nare route once daily.    FLUTICASONE-VILANTEROL (BREO ELLIPTA) 200-25 MCG/DOSE DSDV DISKUS INHALER    Inhale 1 puff into the lungs once daily. Controller    IBUPROFEN (ADVIL,MOTRIN) 800 MG TABLET    Take 1 tablet (800 mg total) by mouth 2 (two) times daily as needed (headache).   Changed and/or Refilled Medications    Modified Medication Previous Medication    LISINOPRIL (PRINIVIL,ZESTRIL) 20 MG TABLET lisinopril (PRINIVIL,ZESTRIL) 20 MG tablet       Take 1 tablet (20 mg total) by mouth once daily.    Take 1 tablet (20 mg total) by mouth once daily.       Patient Active Problem List   Diagnosis    Essential hypertension    BMI 50.0-59.9, adult    CHARLY on CPAP    Nicotine use disorder    Cough  "          Objective:      BP (!) 199/100   Pulse 62   Ht 6' 1" (1.854 m)   Wt (!) 174.5 kg (384 lb 11.2 oz)   BMI 50.76 kg/m²   Estimated body mass index is 50.76 kg/m² as calculated from the following:    Height as of this encounter: 6' 1" (1.854 m).    Weight as of this encounter: 174.5 kg (384 lb 11.2 oz).  Physical Exam   Constitutional: He is oriented to person, place, and time and well-developed, well-nourished, and in no distress.   Cardiovascular: Normal rate and regular rhythm.    Pulmonary/Chest: Breath sounds normal.   Abdominal: Soft. There is no hepatosplenomegaly. There is tenderness in the epigastric area. There is no rigidity, no rebound, no guarding, no CVA tenderness, no tenderness at McBurney's point and negative Calvin's sign.   Musculoskeletal: He exhibits no edema.   Neurological: He is alert and oriented to person, place, and time.         Assessment and Plan:         Problem List Items Addressed This Visit     Essential hypertension - out of x 3 wks.  Refilled.  BP up today but no HA or CP.    Relevant Medications    lisinopril (PRINIVIL,ZESTRIL) 20 MG tablet      Other Visit Diagnoses     Epigastric abdominal pain    -  Primary - suspect gastric pain.  Recent abx but no diarrhea.  Able to keep po down.  Will check labs and give GI cocktail.  Has ondansetron for nausea from [facility] ED.  Apparently had plain films there which were negative.    Relevant Medications    DICYCLOMINE HCL (GI COCKTAIL SIMPLE RECORD)    Other Relevant Orders    Basic metabolic panel    CBC auto differential          Follow Up:   Follow-up if symptoms worsen or fail to improve.        German Dsouza      "

## 2018-02-02 ENCOUNTER — TELEPHONE (OUTPATIENT)
Dept: INTERNAL MEDICINE | Facility: CLINIC | Age: 28
End: 2018-02-02

## 2018-02-02 NOTE — TELEPHONE ENCOUNTER
----- Message from German Dsouza MD sent at 2/2/2018 10:17 AM CST -----  Contact: self/[phone]  Not sure about 3rd medication.  GI cocktail is what was sent as second medication.  Should have gone through to same pharmacy but if didn't can you call in.    German Dsouza    ----- Message -----  From: Crystal Garcia  Sent: 2/1/2018  12:23 PM  To: German Dsouza MD    Pt is calling to speak with someone in the office in regards to the office visit he had on today. He states that the Doctor was supposed to send in 3 different medications. Only one was sent in which was the  lisinopril (PRINIVIL,ZESTRIL) 20 MG tablet. He is calling so that he can get a call back as soon as possible. If the office just wants to fill the medication pt would like it sent to [facility] on [address] [place] [address]  .  Please call and advise. ([phone])    Thank You

## 2018-02-12 DIAGNOSIS — R73.9 HYPERGLYCEMIA: Primary | ICD-10-CM

## 2018-02-12 NOTE — PROGRESS NOTES
Attached please find your results. The sugars were up a little.  Please come in to get a hemoglobin A1c reading.    German Dsouza

## 2018-04-09 ENCOUNTER — OFFICE VISIT (OUTPATIENT)
Dept: SLEEP MEDICINE | Facility: CLINIC | Age: 28
End: 2018-04-09
Payer: COMMERCIAL

## 2018-04-09 ENCOUNTER — TELEPHONE (OUTPATIENT)
Dept: SLEEP MEDICINE | Facility: CLINIC | Age: 28
End: 2018-04-09

## 2018-04-09 VITALS
DIASTOLIC BLOOD PRESSURE: 81 MMHG | BODY MASS INDEX: 41.75 KG/M2 | HEIGHT: 73 IN | HEART RATE: 80 BPM | SYSTOLIC BLOOD PRESSURE: 131 MMHG | WEIGHT: 315 LBS

## 2018-04-09 DIAGNOSIS — K11.7 XEROSTOMIA: ICD-10-CM

## 2018-04-09 DIAGNOSIS — J31.0 CPAP RHINITIS: ICD-10-CM

## 2018-04-09 DIAGNOSIS — G47.33 OSA ON CPAP: Primary | ICD-10-CM

## 2018-04-09 PROCEDURE — 99999 PR PBB SHADOW E&M-EST. PATIENT-LVL III: CPT | Mod: PBBFAC,,, | Performed by: NURSE PRACTITIONER

## 2018-04-09 PROCEDURE — 99214 OFFICE O/P EST MOD 30 MIN: CPT | Mod: S$GLB,,, | Performed by: NURSE PRACTITIONER

## 2018-04-09 NOTE — PROGRESS NOTES
Orville Erwin  was seen in follow-up for CHARLY management and CPAP equipment check.     CHIEF COMPLAINT:    Chief Complaint   Patient presents with    Sleep Apnea     INTERVAL HISTORY:    04/09/2018 SHAYNE Allen NP: Accompanied by wife. Since last clinic visit, pt has gotten set up with CPAP on 07/27/2017. Reports resolution of snoring, excessive daytime sleepiness, excessive daytime fatigue, interrupted sleep, and morning headaches with CPAP. Requires new mask since current one's cushion is cracked. Also reports that current AirFit F20 size medium does not fit well, when pt mouth breaths at night, mouth falls beyond mask; if continuing with same type, thinks he needs to size up. Denies pressure intolerance. Reports oral drying and nasal congestion - air flow decreased with limited mucus. Pt does not use humidity at all. ESS 15.     CPAP Interrogation: DreamStation APAP 15 - 20    Compliance Summary Days with Device Usage: 30 days Percentage of Days >=4 Hours: 90.0% Average Usage (Days Used): 5 hrs. 59 mins. 58 secs. Average Usage (All Days): 5 hrs. 59 mins. 58 secs.   Apnea Indices Average AHI: 3.2 Average OA Index: 0.6 Average CA Index: 0.1  Large Leak Average Time in Large Leak: 31 mins. 52 secs. Average % of Night in Large Leak: 8.9%   Periodic Breathing Average % of Night in PB: 1.0%  90%tile pressure: 17.5 cm       07/12/2017 SHAYNE Allen NP: Initial HISTORY OF PRESENT ILLNESS: Orville Ewrin is a 27 y.o. male is here for sleep evaluation.       Pt diagnosed with CHARLY in 2011 and has been using CPAP since.     Has breakthrough symptoms of disruptive snoring, excessive daytime sleepiness, excessive daytime fatigue, interrupted sleep, and morning headaches. Machine pressure does not feel sufficient.     CPAP Interrogation: Resmed Escape II Therapy hours: 2237 hours, Blower hours: 3488 CPAP 17 cm. No average hours information.     Reports weight gain since 2011 sleep study - 38 lb difference today's  weight and 2011 weight.     Pt does not have PCP at this time, but is scheduled to see Dr. Cheng 7/24 to establish care. Has untreated uncontrolled hypertension. Has not seen doctor in 6 years.   Pt's BP is not controlled. Currently not taking any meds, but have previously been on BP meds; unable to recall which medication.  Pt denies cp/sob/ha/vision or neuro changes. Would like to resume therapy.     Denies symptoms of restless legs or kicking during sleep.    Occupation: works 3 jobs -  (previously volunteer only, went to school and now officially a ),  (few hours Fridays and Saturdays), and  full time    Solon Sleepiness Scale score during initial sleep evaluation was 15.    SLEEP ROUTINE:     Bed partner:  none  Time to bed:  midnight  Sleep onset latency:  Immediately         Disruptions or awakenings: 2 -3    Wakeup time:    4 am  Perceived sleep quality: poor        Baseline Sleep Study: 04/01/2011 Split night study 356 lb. The overall AHI was 107, worse in supine position and REM stage sleep, with an oxygen felipe of 25%. Supplemental oxygen was added at 2 liters/min during the diagnostic portion due to persistently low oxygen saturations that did not recover between CHARLY events.  Effective control of sleep disordered breathing was seen during supine REM stage sleep at a pressure of 17 cm of water.        PAST MEDICAL HISTORY:    Active Ambulatory Problems     Diagnosis Date Noted    Essential hypertension 07/24/2017    BMI 50.0-59.9, adult 07/24/2017    CHARLY on CPAP 07/24/2017    Nicotine use disorder 07/24/2017    Cough      Resolved Ambulatory Problems     Diagnosis Date Noted    No Resolved Ambulatory Problems     Past Medical History:   Diagnosis Date    Hypertension     Obesity     Sleep apnea                 PAST SURGICAL HISTORY:    Past Surgical History:   Procedure Laterality Date    hypoxemia  2016    hospitalized for 4day     TONSILLECTOMY      as child         FAMILY HISTORY:                Family History   Problem Relation Age of Onset    Seizures Mother     Hypertension Father     Diabetes Paternal Grandmother     Constipation Daughter     Alzheimer's disease Maternal Grandmother     Prostate cancer Maternal Grandfather        SOCIAL HISTORY:          Tobacco:   History   Smoking Status    Current Every Day Smoker    Packs/day: 0.50    Types: Vaping with nicotine   Smokeless Tobacco    Former User       Alcohol use:    History   Alcohol Use No                 ALLERGIES:  Review of patient's allergies indicates:  No Known Allergies    CURRENT MEDICATIONS:    Current Outpatient Prescriptions   Medication Sig Dispense Refill    albuterol 90 mcg/actuation inhaler Inhale 2 puffs into the lungs every 4 (four) hours as needed for Wheezing or Shortness of Breath. Rescue 18 g 3    DICYCLOMINE HCL (GI COCKTAIL SIMPLE RECORD) Take 15 mLs by mouth every 8 (eight) hours as needed. 100 mL 0    fluticasone (FLONASE) 50 mcg/actuation nasal spray 2 sprays by Each Nare route once daily. 1 Bottle 3    fluticasone-vilanterol (BREO ELLIPTA) 200-25 mcg/dose DsDv diskus inhaler Inhale 1 puff into the lungs once daily. Controller 1 each 5    ibuprofen (ADVIL,MOTRIN) 800 MG tablet Take 1 tablet (800 mg total) by mouth 2 (two) times daily as needed (headache). 30 tablet 0    lisinopril (PRINIVIL,ZESTRIL) 20 MG tablet Take 1 tablet (20 mg total) by mouth once daily. 30 tablet 0     No current facility-administered medications for this visit.                   REVIEW OF SYSTEMS:     Sleep related symptoms as per HPI.  CONST:Reports weight gain    HEENT: Denies sinus congestion  PULM: Reports dyspnea  CARD:  Denies palpitations   GI:  Denies acid reflux  : Denies polyuria  NEURO: Reports headaches  PSYCH: Denies mood disturbance  HEME: Denies anemia    Otherwise, a balance of systems reviewed is negative.          PHYSICAL EXAM:  Vitals:     "04/09/18 1345   BP: 131/81   Pulse: 80   Weight: (!) 170.2 kg (375 lb 3.6 oz)   Height: 6' 1" (1.854 m)   PainSc: 0-No pain     Body mass index is 49.5 kg/m².     GENERAL: Obese body habitus, well groomed  HEENT:  Conjunctivae are non-erythematous; Pupils equal, round, and reactive to light; Nose is symmetrical; Nasal mucosa is pink and moist; Septum is midline; Inferior turbinates are normal; Nasal airflow is normal; Posterior pharynx is pink; Modified Mallampati: IV; Posterior palate is normal; Tonsils +1; Uvula is normal and pink;Tongue is normal; Dentition is fair; No TMJ tenderness; Jaw opening and protrusion without click and without discomfort.  NECK: Supple. No thyromegaly. No palpable nodes.    SKIN: On face and neck: No abrasions, no rashes, no lesions.  No subcutaneous nodules are palpable.  RESPIRATORY: Chest is clear to auscultation.  Normal chest expansion and non-labored breathing at rest.  CARDIOVASCULAR: Normal S1, S2.  No murmurs, gallops or rubs. No carotid bruits bilaterally.  EXTREMITIES: No edema. No clubbing. No cyanosis. Station normal. Gait normal.        NEURO/PSYCH: Oriented to time, place and person. Normal attention span and concentration. Affect is full. Mood is normal.                                              ASSESSMENT:    Obstructive sleep apnea, very severe by AHI. The patient symptomatically has breakthrough symptoms of disruptive snoring, excessive daytime sleepiness, excessive daytime fatigue, interrupted sleep, and morning headaches resolved with auto CPAP machine. The patient is adherent on CPAP and experiencing symptomatic benefit. Medical co-morbidities: HTN and obesity.  This warrants continued treatment for obstructive sleep apnea.      CPAP therapy side effects: CPAP rhinitis and xerostomia     Morbid obesity, BMI > 45, discussed relationship between CHARLY and weight; 19-lb weight loss since last visit due to working out a lot, but does not have formal exercise or diet " modification     Current everyday smoker, vape with nicotine, not yet ready to quit    PLAN:    Treatment:  - continue auto CPAP machine 15 - 20 cm  -Immediately go to [facility] to get replacement FFM and other supplies  -Start using humidification with heated tubing (rainout a concern to patient)  -Start daily dosing of Flonase  -Additional therapy for dry mouth, ensure proper hydration and start Biotene oral rinses  -Provided with Rx for SoClean machine, should pt want to purchase from online vendor   -RTC 12 months, sooner if needed    Counseling regarding benefits of healthy eating and physical activity (150 minutes of moderate-intensity aerobic activity per week) for weight reduction which can improve overall health.      Education: During our discussion today, we talked about the etiology of obstructive sleep apnea as well as the potential ramifications of untreated sleep apnea, which could include daytime sleepiness, hypertension, heart disease and/or stroke. We discussed potential treatment options, which could include weight loss, body positioning, continuous positive airway pressure (CPAP), or referral for surgical consideration.     Precautions: The patient was advised to abstain from driving should they feel sleepy  or drowsy.

## 2018-04-09 NOTE — LETTER
[facility]  [address]  [facility] [address]  Phone: [phone]       April 9, 2018       To Whom It May Concern:       Please excuse Mr. Erwin from work. He was seen in the Sleep Clinic today.     He may return to work on April 10, 2018.       For questions or concerns, please contact me.             Sincerely,          BITA Allen NP

## 2018-04-09 NOTE — TELEPHONE ENCOUNTER
----- Message from Nichole Jeffrey sent at 4/9/2018  9:18 AM CDT -----  Contact: pt      Name of Who is Calling: VITALIY MORALES [[id number]]      What is the request in detail: pt calling in regards to cpap mask not fitting.. Pt states he also have a crack in his mask.. Pt would like to speak with staff in regards to getting machine replaced..Please advise..      Can the clinic reply by [facility]: no      [facility] [phone]

## 2018-04-09 NOTE — TELEPHONE ENCOUNTER
Called patient back.  Pt stated he needs new machine because mask doesn't fit correctly and he is not receiving his supplies anymore.  Informed pt per Insurance guidelines he had to keep his standard f/u in order for insurance to pay for supplies.      Pt had cancelled 3 previous appts.    Pt wish to be seen today.      So scheduled pt for today at 1:40pm with Radha  Last office visit 7/12/17

## 2018-05-08 ENCOUNTER — TELEPHONE (OUTPATIENT)
Dept: SLEEP MEDICINE | Facility: CLINIC | Age: 28
End: 2018-05-08

## 2018-05-08 ENCOUNTER — OFFICE VISIT (OUTPATIENT)
Dept: INTERNAL MEDICINE | Facility: CLINIC | Age: 28
End: 2018-05-08
Payer: COMMERCIAL

## 2018-05-08 ENCOUNTER — HOSPITAL ENCOUNTER (OUTPATIENT)
Dept: RADIOLOGY | Facility: HOSPITAL | Age: 28
Discharge: HOME | End: 2018-05-08
Attending: STUDENT IN AN ORGANIZED HEALTH CARE EDUCATION/TRAINING PROGRAM
Payer: COMMERCIAL

## 2018-05-08 VITALS
SYSTOLIC BLOOD PRESSURE: 132 MMHG | HEIGHT: 73 IN | BODY MASS INDEX: 41.75 KG/M2 | HEART RATE: 81 BPM | WEIGHT: 315 LBS | OXYGEN SATURATION: 97 % | DIASTOLIC BLOOD PRESSURE: 70 MMHG

## 2018-05-08 DIAGNOSIS — M79.651 RIGHT THIGH PAIN: ICD-10-CM

## 2018-05-08 DIAGNOSIS — J45.20 MILD INTERMITTENT ASTHMA, UNSPECIFIED WHETHER COMPLICATED: ICD-10-CM

## 2018-05-08 DIAGNOSIS — M72.2 PLANTAR FASCIITIS OF LEFT FOOT: Primary | ICD-10-CM

## 2018-05-08 DIAGNOSIS — M25.572 ACUTE LEFT ANKLE PAIN: ICD-10-CM

## 2018-05-08 PROCEDURE — 99999 PR PBB SHADOW E&M-EST. PATIENT-LVL IV: CPT | Mod: PBBFAC,,, | Performed by: STUDENT IN AN ORGANIZED HEALTH CARE EDUCATION/TRAINING PROGRAM

## 2018-05-08 PROCEDURE — 3008F BODY MASS INDEX DOCD: CPT | Mod: CPTII,S$GLB,, | Performed by: STUDENT IN AN ORGANIZED HEALTH CARE EDUCATION/TRAINING PROGRAM

## 2018-05-08 PROCEDURE — 73630 X-RAY EXAM OF FOOT: CPT | Mod: 26,LT,, | Performed by: RADIOLOGY

## 2018-05-08 PROCEDURE — 99214 OFFICE O/P EST MOD 30 MIN: CPT | Mod: S$GLB,,, | Performed by: STUDENT IN AN ORGANIZED HEALTH CARE EDUCATION/TRAINING PROGRAM

## 2018-05-08 PROCEDURE — 3075F SYST BP GE 130 - 139MM HG: CPT | Mod: CPTII,S$GLB,, | Performed by: STUDENT IN AN ORGANIZED HEALTH CARE EDUCATION/TRAINING PROGRAM

## 2018-05-08 PROCEDURE — 3078F DIAST BP <80 MM HG: CPT | Mod: CPTII,S$GLB,, | Performed by: STUDENT IN AN ORGANIZED HEALTH CARE EDUCATION/TRAINING PROGRAM

## 2018-05-08 PROCEDURE — 73630 X-RAY EXAM OF FOOT: CPT | Mod: TC,LT

## 2018-05-08 RX ORDER — ALBUTEROL SULFATE 90 UG/1
2 AEROSOL, METERED RESPIRATORY (INHALATION) EVERY 4 HOURS PRN
Qty: 18 G | Refills: 3 | Status: SHIPPED | OUTPATIENT
Start: 2018-05-08 | End: 2020-03-02 | Stop reason: SDUPTHER

## 2018-05-08 RX ORDER — IBUPROFEN 800 MG/1
800 TABLET ORAL 2 TIMES DAILY PRN
Qty: 30 TABLET | Refills: 0 | Status: SHIPPED | OUTPATIENT
Start: 2018-05-08 | End: 2018-05-09 | Stop reason: SDUPTHER

## 2018-05-08 NOTE — TELEPHONE ENCOUNTER
Holly, please advise pt to contact [facility] to inquire about mask fitting for different type mask. I will update his Rx as needed, but this need needs to be addressed by [facility].
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
## 2018-05-08 NOTE — LETTER
May 8, 2018       [facility]  [address]  [facility] [address]  Phone: [phone]  Fax: [phone]       Patient: Orville Erwin   YOB: 1990  Date of Visit: 05/08/2018    To Whom It May Concern:    Abbey Erwin  was at [facility] on 05/08/2018. He may return to work on 5/10/18 with no restrictions. Should avoid walking / weightbearing 5/8 and 5/9. If any questions or concerns, or if I can be of further assistance, please do not hesitate to contact me.    Sincerely,        Adrián Johnson MD

## 2018-05-08 NOTE — TELEPHONE ENCOUNTER
----- Message from Dimple Hernandez sent at 5/8/2018 12:19 PM CDT -----  Contact: Pt  Name of Who is Calling: VITALIY MORALES [[id number]]    What is the request in detail: Patient states his current CPAP mask is not working and would like to get a new order for a different type... Please contact to further discuss and advise       Can the clinic reply by [facility]: No      [facility]: [phone]

## 2018-05-08 NOTE — PATIENT INSTRUCTIONS
Plantar Fasciitis  Plantar fasciitis is a painful swelling of the plantar fascia. The plantar fascia is a thick, fibrous layer of tissue. It covers the bones on the bottom of your foot. And it supports the foot bones in an arched position.  This can happen gradually or suddenly. It usually affects one foot at a time. Heel pain can be sharp, like a knife sticking into the bottom of your foot. You may feel pain after exercising, long-distance jogging, stair climbing, long periods of standing, or after standing up.  Risk factors include: non-active lifestyle, arthritis, diabetes, obesity or recent weight gain, flat foot, high arch. Wearing high heels, loose shoes, or shoes with poor arch support for long periods of time adds to the risk. This problem is commonly found in runners and dancers. It also found in people who stand on hard surfaces for long periods of time.  Foot pain from this condition is usually worse in the morning. But it improves with walking. By the end of the day there may be a dull aching. Treatment requires short-term rest and controlling swelling. It may take up to 9 months before all symptoms go away. Rarely, a steroid injection into the foot, or surgery, may be needed.  Home care  · If you are overweight, lose weight to help healing.  · Choose supportive shoes with good arch support and shock absorbency. Replace athletic shoes when they become worn out. Dont walk or run barefoot.  · Premade or custom-fitted shoe inserts may be helpful. Inserts made of silicone seem to be the most effective. Custom-made inserts can be provided by a podiatrist or foot specialist, physical therapist, or orthopedist.  · Premade or custom-made night splints keep the heel stretched out while you sleep. They may prevent morning pain.  · Avoid activities that stress the feet: jogging, prolonged standing or walking, contact sports, etc.  · First thing in the morning and before sports, stretch the bottom of your feet.  Gently flex your ankle so the toes move toward your knee.  · Icing may help control heel pain. Apply an ice pack to the heel for 10-20 minutes as a preventive. Or ice your heel after a severe flare-up of symptoms. You may repeat this every 1-2 hours as needed.  · You may use over-the-counter pain medicine to control pain, unless another medicine was prescribed. Anti-inflammatory pain medicines, such as ibuprofen or naproxen, may work better than acetaminophen. If you have chronic liver or kidney disease or ever had a stomach ulcer or GI bleeding, talk with your healthcare provider before using these medicines.  Follow-up care  Follow up with your healthcare provider, physical therapist, or podiatrist or foot specialist as advised.  Call for an appointment if pain worsens or there is no relief after a few weeks of home treatment. Shoe inserts, a night splint, or a special boot may be required.  If X-rays were taken, you will be told of any new findings that may affect your care.  When to seek medical advice  Call your healthcare provider right away if any of these occur:  · Foot swelling  · Redness with increasing pain  Date Last Reviewed: 11/21/2015  © 6565-6512 [employer]. [address] [place] [address], [place] [address]. All rights reserved. This information is not intended as a substitute for professional medical care. Always follow your healthcare professional's instructions.

## 2018-05-09 ENCOUNTER — OFFICE VISIT (OUTPATIENT)
Dept: PULMONOLOGY | Facility: CLINIC | Age: 28
End: 2018-05-09
Payer: COMMERCIAL

## 2018-05-09 ENCOUNTER — HOSPITAL ENCOUNTER (OUTPATIENT)
Dept: RADIOLOGY | Facility: HOSPITAL | Age: 28
Discharge: HOME | End: 2018-05-09
Attending: STUDENT IN AN ORGANIZED HEALTH CARE EDUCATION/TRAINING PROGRAM
Payer: COMMERCIAL

## 2018-05-09 VITALS — WEIGHT: 315 LBS | HEIGHT: 71 IN | BODY MASS INDEX: 44.1 KG/M2 | HEART RATE: 74 BPM | OXYGEN SATURATION: 96 %

## 2018-05-09 DIAGNOSIS — F17.200 NICOTINE USE DISORDER: Primary | ICD-10-CM

## 2018-05-09 DIAGNOSIS — J45.30 MILD PERSISTENT ASTHMA WITHOUT COMPLICATION: ICD-10-CM

## 2018-05-09 DIAGNOSIS — M72.2 PLANTAR FASCIITIS OF LEFT FOOT: ICD-10-CM

## 2018-05-09 DIAGNOSIS — M79.651 RIGHT THIGH PAIN: ICD-10-CM

## 2018-05-09 PROCEDURE — 99213 OFFICE O/P EST LOW 20 MIN: CPT | Mod: S$GLB,,, | Performed by: NURSE PRACTITIONER

## 2018-05-09 PROCEDURE — 76705 ECHO EXAM OF ABDOMEN: CPT | Mod: TC

## 2018-05-09 PROCEDURE — 76705 ECHO EXAM OF ABDOMEN: CPT | Mod: 26,,, | Performed by: RADIOLOGY

## 2018-05-09 PROCEDURE — 3008F BODY MASS INDEX DOCD: CPT | Mod: CPTII,S$GLB,, | Performed by: NURSE PRACTITIONER

## 2018-05-09 PROCEDURE — 99999 PR PBB SHADOW E&M-EST. PATIENT-LVL III: CPT | Mod: PBBFAC,,, | Performed by: NURSE PRACTITIONER

## 2018-05-09 RX ORDER — BUDESONIDE AND FORMOTEROL FUMARATE DIHYDRATE 160; 4.5 UG/1; UG/1
2 AEROSOL RESPIRATORY (INHALATION) EVERY 12 HOURS
Qty: 1 INHALER | Refills: 11 | Status: SHIPPED | OUTPATIENT
Start: 2018-05-09 | End: 2020-03-02 | Stop reason: SDUPTHER

## 2018-05-09 RX ORDER — IBUPROFEN 800 MG/1
800 TABLET ORAL 2 TIMES DAILY PRN
Qty: 30 TABLET | Refills: 0 | Status: SHIPPED | OUTPATIENT
Start: 2018-05-09 | End: 2018-08-01 | Stop reason: SDUPTHER

## 2018-05-09 RX ORDER — FLUTICASONE PROPIONATE 50 MCG
2 SPRAY, SUSPENSION (ML) NASAL DAILY
Qty: 1 BOTTLE | Refills: 3 | Status: SHIPPED | OUTPATIENT
Start: 2018-05-09 | End: 2020-03-02 | Stop reason: SDUPTHER

## 2018-05-09 RX ORDER — IBUPROFEN 800 MG/1
800 TABLET ORAL 2 TIMES DAILY PRN
Qty: 30 TABLET | Refills: 0 | OUTPATIENT
Start: 2018-05-09

## 2018-05-09 NOTE — TELEPHONE ENCOUNTER
----- Message from Elizabeth Luque sent at 5/8/2018  5:59 PM CDT -----  Contact: patient  Says the doctor forgot one of his medications.    He can be reached at [phone]    Thanks  KB

## 2018-05-09 NOTE — PROGRESS NOTES
Subjective:       Patient ID: Orville Erwin is a 27 y.o. male.    Chief Complaint:   HPI  Orville Erwin is a 27 y.o. male who is a smoker cigarettes and vape presents today for follow up asthma. His medical hx includes HTN, obesity, and obstructive sleep apnea (compliant with CPAP therapy). He has a hx of asthma dx as a child but was not symptomatic. He was found to have abnormal lung function at work and came to this clinic for clearance. His PFT's showed small airway obstruction with significant improvement post BD. He was placed on daily inhaler therapy to optimize his lung function. He has not been compliant with his medication. He is still smoking. He does not feel he has any issues with his breathing but understands need for daily maintenance inhaler with regards to work and activity.  He denies shortness of breath, wheezing, BROWN, chest pain, fever, or chills.    Review of patient's allergies indicates:  No Known Allergies  Past Medical History:   Diagnosis Date    Hypertension     Obesity     Sleep apnea      Past Surgical History:   Procedure Laterality Date    hypoxemia  2016    hospitalized for 4day    TONSILLECTOMY      as child     Family History     Problem Relation (Age of Onset)    Alzheimer's disease Maternal Grandmother    Constipation Daughter    Diabetes Paternal Grandmother    Hypertension Father    Prostate cancer Maternal Grandfather    Seizures Mother        Social History Main Topics    Smoking status: Current Every Day Smoker     Packs/day: 0.50     Years: 10.00    Smokeless tobacco: Former User    Alcohol use No      Comment: socially    Drug use: No    Sexual activity: Yes     Partners: Female       Review of Systems   Constitutional: Negative for fever, chills and weight loss.   HENT: Negative for trouble swallowing.    Respiratory: Negative for cough, hemoptysis, shortness of breath, wheezing and use of rescue inhaler.    Cardiovascular: Negative  "for chest pain and leg swelling.   Gastrointestinal: Negative for acid reflux.       Objective:       Vitals:    05/09/18 1530   Pulse: 74   SpO2: 96%   Weight: (!) 170.9 kg (376 lb 12.3 oz)   Height: 5' 11" (1.803 m)     Physical Exam   Constitutional: He is oriented to person, place, and time. No distress. He is obese.   Cardiovascular: Normal rate and intact distal pulses.    Pulmonary/Chest: Normal expansion, symmetric chest wall expansion and effort normal. No respiratory distress. He has decreased breath sounds (in bases bilaterally). He has no wheezes. He has no rhonchi. He has no rales.   Musculoskeletal: He exhibits no edema.   Neurological: He is alert and oriented to person, place, and time.   Skin: Skin is warm and dry.   Psychiatric: He has a normal mood and affect.     Personal Diagnostic Review     PFT's reviewed 9/22/17:  small airway obstruction with significant improvement post bronchodilator. Normal DLCO  CXR reviewed 9/22/17: lung clear, no effusions   Assessment:         Outpatient Encounter Prescriptions as of 5/9/2018   Medication Sig Dispense Refill    albuterol 90 mcg/actuation inhaler Inhale 2 puffs into the lungs every 4 (four) hours as needed for Wheezing or Shortness of Breath. Rescue 18 g 3    DICYCLOMINE HCL (GI COCKTAIL SIMPLE RECORD) Take 15 mLs by mouth every 8 (eight) hours as needed. 100 mL 0    fluticasone (FLONASE) 50 mcg/actuation nasal spray 2 sprays by Each Nare route once daily. 1 Bottle 3    fluticasone-vilanterol (BREO ELLIPTA) 200-25 mcg/dose DsDv diskus inhaler Inhale 1 puff into the lungs once daily. Controller 1 each 5    ibuprofen (ADVIL,MOTRIN) 800 MG tablet Take 1 tablet (800 mg total) by mouth 2 (two) times daily as needed (foot pain). 30 tablet 0    lisinopril (PRINIVIL,ZESTRIL) 20 MG tablet Take 1 tablet (20 mg total) by mouth once daily. 30 tablet 0     No facility-administered encounter medications on file as of 5/9/2018.      Problem List Items Addressed " This Visit        Other    Nicotine use disorder - Primary      Other Visit Diagnoses     Plantar fasciitis of left foot        Relevant Medications    ibuprofen (ADVIL,MOTRIN) 800 MG tablet    Mild persistent asthma without complication        Relevant Medications    budesonide-formoterol 160-4.5 mcg (SYMBICORT) 160-4.5 mcg/actuation HFAA        Plan:       · Take symbicort 2 puffs 2 times daily. Rinse and gargle after every use.  · Take albuterol for rescue.  · Advised to stop use of nicotine products such as cigarettes, vapes, ect.  Follow up 6 months.  Contact us if any issues or concerns should arise.   Patient verbalized understanding of all information, instruction, education, recommendations provided and agrees with plan of care.

## 2018-05-10 ENCOUNTER — TELEPHONE (OUTPATIENT)
Dept: INTERNAL MEDICINE | Facility: CLINIC | Age: 28
End: 2018-05-10

## 2018-05-15 ENCOUNTER — TELEPHONE (OUTPATIENT)
Dept: INTERNAL MEDICINE | Facility: CLINIC | Age: 28
End: 2018-05-15

## 2018-05-15 NOTE — TELEPHONE ENCOUNTER
Spoke to patient's spouse over phone as patient did not respond to [phone]. Informed of x-ray and abd US results.

## 2018-07-29 DIAGNOSIS — I10 ESSENTIAL HYPERTENSION: ICD-10-CM

## 2018-07-29 DIAGNOSIS — M72.2 PLANTAR FASCIITIS OF LEFT FOOT: ICD-10-CM

## 2018-07-30 RX ORDER — LISINOPRIL 20 MG/1
TABLET ORAL
Qty: 90 TABLET | Refills: 3 | OUTPATIENT
Start: 2018-07-30

## 2018-08-01 RX ORDER — IBUPROFEN 800 MG/1
TABLET ORAL
Qty: 30 TABLET | Refills: 0 | Status: SHIPPED | OUTPATIENT
Start: 2018-08-01 | End: 2020-03-02 | Stop reason: ALTCHOICE

## 2019-04-09 ENCOUNTER — TELEPHONE (OUTPATIENT)
Dept: SLEEP MEDICINE | Facility: CLINIC | Age: 29
End: 2019-04-09

## 2019-10-29 ENCOUNTER — HOSPITAL ENCOUNTER (EMERGENCY)
Facility: HOSPITAL | Age: 29
Discharge: HOME | End: 2019-10-29
Attending: EMERGENCY MEDICINE

## 2019-10-29 VITALS
HEIGHT: 73 IN | SYSTOLIC BLOOD PRESSURE: 146 MMHG | BODY MASS INDEX: 41.75 KG/M2 | RESPIRATION RATE: 16 BRPM | OXYGEN SATURATION: 99 % | HEART RATE: 95 BPM | WEIGHT: 315 LBS | DIASTOLIC BLOOD PRESSURE: 74 MMHG | TEMPERATURE: 98 F

## 2019-10-29 DIAGNOSIS — J11.1 INFLUENZA-LIKE ILLNESS: Primary | ICD-10-CM

## 2019-10-29 DIAGNOSIS — B34.9 VIRAL SYNDROME: ICD-10-CM

## 2019-10-29 DIAGNOSIS — J02.9 PHARYNGITIS, UNSPECIFIED ETIOLOGY: ICD-10-CM

## 2019-10-29 LAB
CTP QC/QA: YES
DEPRECATED S PYO AG THROAT QL EIA: NEGATIVE
POC MOLECULAR INFLUENZA A AGN: NEGATIVE
POC MOLECULAR INFLUENZA B AGN: NEGATIVE

## 2019-10-29 PROCEDURE — 87081 CULTURE SCREEN ONLY: CPT

## 2019-10-29 PROCEDURE — 87880 STREP A ASSAY W/OPTIC: CPT

## 2019-10-29 PROCEDURE — 99284 PR EMERGENCY DEPT VISIT,LEVEL IV: ICD-10-PCS | Mod: ,,, | Performed by: EMERGENCY MEDICINE

## 2019-10-29 PROCEDURE — 99284 EMERGENCY DEPT VISIT MOD MDM: CPT | Mod: ,,, | Performed by: EMERGENCY MEDICINE

## 2019-10-29 PROCEDURE — 99283 EMERGENCY DEPT VISIT LOW MDM: CPT

## 2019-10-29 PROCEDURE — 87502 INFLUENZA DNA AMP PROBE: CPT

## 2019-10-29 PROCEDURE — 63600175 PHARM REV CODE 636 W HCPCS: Performed by: EMERGENCY MEDICINE

## 2019-10-29 PROCEDURE — 25000003 PHARM REV CODE 250: Performed by: EMERGENCY MEDICINE

## 2019-10-29 RX ORDER — ACETAMINOPHEN 500 MG
1000 TABLET ORAL
Status: COMPLETED | OUTPATIENT
Start: 2019-10-29 | End: 2019-10-29

## 2019-10-29 RX ORDER — IBUPROFEN 600 MG/1
600 TABLET ORAL
Status: COMPLETED | OUTPATIENT
Start: 2019-10-29 | End: 2019-10-29

## 2019-10-29 RX ORDER — DEXAMETHASONE 4 MG/1
8 TABLET ORAL
Status: COMPLETED | OUTPATIENT
Start: 2019-10-29 | End: 2019-10-29

## 2019-10-29 RX ADMIN — IBUPROFEN 600 MG: 600 TABLET, FILM COATED ORAL at 02:10

## 2019-10-29 RX ADMIN — ACETAMINOPHEN 1000 MG: 500 TABLET ORAL at 02:10

## 2019-10-29 RX ADMIN — DEXAMETHASONE 8 MG: 4 TABLET ORAL at 02:10

## 2019-10-29 NOTE — ED PROVIDER NOTES
"Encounter Date: 10/29/2019    SCRIBE #1 NOTE: I, Nevin Ghotra, am scribing for, and in the presence of,  Dr. Webb. I have scribed the following portions of the note - Other sections scribed: HPI, ROS, PE.       History     Chief Complaint   Patient presents with    Generalized Body Aches     Pt states "I woke up this am felt like I was hit by a truck".  Pt sent by his employee health to be checked for flu.       Time patient was seen by the provider: 2:02 PM      The patient is a 29 y.o. male with PMHX including: hypertension, who presents to the ED with a complaint of generalized body aches that began this morning. Th e patient reports he woke up this morning "feeling like I was hit by a truck". Notes of associated fever which he measured at 100 F at work today. Also congestion, post nasal drip and sore throat. Patient uses CPAP every night. Denies chest pain, SOB, rash, dysuria.     The history is provided by the patient and medical records.     Review of patient's allergies indicates:  No Known Allergies  Past Medical History:   Diagnosis Date    Hypertension     Obesity     Sleep apnea      Past Surgical History:   Procedure Laterality Date    hypoxemia  2016    hospitalized for 4day    TONSILLECTOMY      as child     Family History   Problem Relation Age of Onset    Seizures Mother     Hypertension Father     Diabetes Paternal Grandmother     Constipation Daughter     Alzheimer's disease Maternal Grandmother     Prostate cancer Maternal Grandfather      Social History     Tobacco Use    Smoking status: Current Every Day Smoker     Packs/day: 0.50     Years: 10.00     Pack years: 5.00    Smokeless tobacco: Former User   Substance Use Topics    Alcohol use: No     Comment: socially    Drug use: No     Review of Systems   Constitutional: Positive for fever.   HENT: Positive for congestion, postnasal drip and sore throat.    Respiratory: Negative for shortness of breath.    Cardiovascular: " Negative for chest pain.   Gastrointestinal: Negative for nausea.   Genitourinary: Negative for dysuria.   Musculoskeletal: Positive for myalgias. Negative for back pain.   Skin: Negative for rash.   Neurological: Negative for weakness.   Hematological: Does not bruise/bleed easily.       Physical Exam     Initial Vitals [10/29/19 1331]   BP Pulse Resp Temp SpO2   (!) 146/74 95 16 97.6 °F (36.4 °C) 99 %      MAP       --         Physical Exam    Nursing note and vitals reviewed.    Gen: AxOx3, NAD, well nourished  HEENT: NCAT, EOMI, OP clear, neck supple with FROM, bilateral tonsillar enlargement with erythema, no exudates, tender cervical lymphadenopathy, nasal congestion.    CVS: RRR, no m/r/g, distal pulses intact/symmetric  Pulm: CTAB, no wheezes, rales or rhonchi, no increased work of breathing  Abd: soft, nontender, nondistended, no organomegaly, no CVAT  Ext: no edema, lesions, rashes, or deformity  Neuro: GCS15, moving all extremities, gait intact  Psych: normal affect, cooperative      ED Course   Procedures  Labs Reviewed   THROAT SCREEN, RAPID   CULTURE, STREP A,  THROAT   POCT INFLUENZA A/B MOLECULAR          Imaging Results    None          Medical Decision Making:   History:   Old Medical Records: I decided to obtain old medical records.  Initial Assessment:   This is a 29-year-old male who presents feeling like he has an influenza like illness.  He has had 1 day of symptoms, and they include myalgias, sore throat.  He reports greenish brown secretions in his CPAP mask.  His lungs are clear to auscultation, he has no chest tightness or shortness of breath. Plan for rapid flu and rapid strep.  Differential Diagnosis:   I suspect a URI versus influenza like illness versus I have low suspicion for pneumonia or sepsis.  Clinical Tests:   Lab Tests: Ordered and Reviewed  ED Management:  We administered a dose of steroids given his tonsillar enlargement on exam.  His rapid flu and strep are negative, and I  explained my rationale to the patient about why he did not need antibiotics at this time.  He continued to request a penicillin shot, and I reiterated that he did not have indication for this at this time.  He did voiced frustration with this plan, but otherwise was agreeable to discharge with fluids at home, Tylenol and Motrin and we discussed return precautions.  Patient was discharged in good condition.            Scribe Attestation:   Scribe #1: I performed the above scribed service and the documentation accurately describes the services I performed. I attest to the accuracy of the note.               Clinical Impression:       ICD-10-CM ICD-9-CM   1. Influenza-like illness R69 799.89   2. Pharyngitis, unspecified etiology J02.9 462   3. Viral syndrome B34.9 079.99                                Zee Webb MD  10/29/19 1706

## 2019-10-29 NOTE — DISCHARGE INSTRUCTIONS
You were seen in the emergency department with flu-like symptoms. You flu test is negative, and a strep test was sent and is negative. A throat culture has been sent and will result in the next couple of days.  You do not have any bacterial infection at this time.  Take Tylenol and ibuprofen for your pain. Drink plenty of fluids.  Return to the emergency department if you develop difficulty swallowing liquids, trouble breathing, or any other new or concerning symptoms.    Our goal in the emergency department is to always give you outstanding care and exceptional service. You may receive a survey by mail or e-mail in the next week regarding your experience in our ED. We would greatly appreciate your completing and returning the survey. Your feedback provides us with a way to recognize our staff who give very good care and it helps us learn how to improve when your experience was below our aspiration of excellence.

## 2019-10-29 NOTE — ED NOTES
Patient identifiers verified and correct for Orville Matute  LOC: The patient is awake, alert and aware of environment with an appropriate affect, the patient is oriented x 3 and speaking appropriately.   APPEARANCE: Patient appears comfortable and in no acute distress, patient is clean and well groomed.  SKIN: The skin is warm and dry, color consistent with ethnicity, patient has normal skin turgor and moist mucus membranes, skin intact, no breakdown or bruising noted.   MUSCULOSKELETAL: Patient moving all extremities spontaneously, no swelling noted Generalized body aches  RESPIRATORY: Airway is open and patent, respirations are spontaneous, patient has a normal effort and rate, no accessory muscle use noted, O2 sats noted at 99% on room air.  CARDIAC: Denies chest pain capillary refill < 3 seconds.   GASTRO: Soft and non tender to palpation, no distention noted, normoactive bowel sounds present in all four quadrants. Pt states bowel movements have been regular.  : Pt denies any pain or frequency with urination.  NEURO: Pt opens eyes spontaneously, behavior appropriate to situation, follows commands, facial expression symmetrical, bilateral hand grasp equal and even, purposeful motor response noted, normal sensation in all extremities when touched with a finger.

## 2019-10-29 NOTE — ED TRIAGE NOTES
Patient reports to the ED today with reports of generalized body aches onset this morning. Patient endorses cough and congestions at this time

## 2019-10-31 LAB — BACTERIA THROAT CULT: NORMAL

## 2020-02-29 ENCOUNTER — HOSPITAL ENCOUNTER (EMERGENCY)
Facility: HOSPITAL | Age: 30
Discharge: HOME | End: 2020-02-29
Attending: INTERNAL MEDICINE
Payer: COMMERCIAL

## 2020-02-29 VITALS
TEMPERATURE: 98 F | DIASTOLIC BLOOD PRESSURE: 57 MMHG | OXYGEN SATURATION: 97 % | SYSTOLIC BLOOD PRESSURE: 111 MMHG | BODY MASS INDEX: 41.75 KG/M2 | HEIGHT: 73 IN | WEIGHT: 315 LBS | RESPIRATION RATE: 18 BRPM | HEART RATE: 75 BPM

## 2020-02-29 DIAGNOSIS — I16.0 HYPERTENSIVE URGENCY: Primary | ICD-10-CM

## 2020-02-29 DIAGNOSIS — E86.0 DEHYDRATION: ICD-10-CM

## 2020-02-29 LAB
ALBUMIN SERPL-MCNC: 4 G/DL (ref 3.3–5.5)
ALP SERPL-CCNC: 50 U/L (ref 42–141)
BILIRUB SERPL-MCNC: 0.9 MG/DL (ref 0.2–1.6)
BILIRUBIN, POC UA: NEGATIVE
BLOOD, POC UA: NEGATIVE
BUN SERPL-MCNC: 12 MG/DL (ref 7–22)
CALCIUM SERPL-MCNC: 9.2 MG/DL (ref 8–10.3)
CHLORIDE SERPL-SCNC: 104 MMOL/L (ref 98–108)
CLARITY, POC UA: CLEAR
COLOR, POC UA: YELLOW
CREAT SERPL-MCNC: 0.7 MG/DL (ref 0.6–1.2)
GLUCOSE SERPL-MCNC: 90 MG/DL (ref 73–118)
GLUCOSE, POC UA: NEGATIVE
KETONES, POC UA: NEGATIVE
LEUKOCYTE EST, POC UA: NEGATIVE
NITRITE, POC UA: NEGATIVE
PH UR STRIP: 7 [PH]
POC ALT (SGPT): 43 U/L (ref 10–47)
POC AST (SGOT): 33 U/L (ref 11–38)
POC B-TYPE NATRIURETIC PEPTIDE: 10 PG/ML (ref 0–100)
POC CARDIAC TROPONIN I: 0 NG/ML
POC TCO2: 27 MMOL/L (ref 18–33)
POTASSIUM BLD-SCNC: 4.3 MMOL/L (ref 3.6–5.1)
PROTEIN, POC UA: NEGATIVE
PROTEIN, POC: 7.3 G/DL (ref 6.4–8.1)
SAMPLE: NORMAL
SODIUM BLD-SCNC: 143 MMOL/L (ref 128–145)
SPECIFIC GRAVITY, POC UA: 1.02
UROBILINOGEN, POC UA: 0.2 E.U./DL

## 2020-02-29 PROCEDURE — 81003 URINALYSIS AUTO W/O SCOPE: CPT | Mod: ER

## 2020-02-29 PROCEDURE — 63600175 PHARM REV CODE 636 W HCPCS: Mod: ER | Performed by: INTERNAL MEDICINE

## 2020-02-29 PROCEDURE — 80053 COMPREHEN METABOLIC PANEL: CPT | Mod: ER

## 2020-02-29 PROCEDURE — 99285 EMERGENCY DEPT VISIT HI MDM: CPT | Mod: 25,ER

## 2020-02-29 PROCEDURE — 93005 ELECTROCARDIOGRAM TRACING: CPT | Mod: ER

## 2020-02-29 PROCEDURE — 83880 ASSAY OF NATRIURETIC PEPTIDE: CPT | Mod: ER

## 2020-02-29 PROCEDURE — 96374 THER/PROPH/DIAG INJ IV PUSH: CPT | Mod: ER

## 2020-02-29 PROCEDURE — 93010 ELECTROCARDIOGRAM REPORT: CPT | Mod: ,,, | Performed by: INTERNAL MEDICINE

## 2020-02-29 PROCEDURE — 85025 COMPLETE CBC W/AUTO DIFF WBC: CPT | Mod: ER

## 2020-02-29 PROCEDURE — 84484 ASSAY OF TROPONIN QUANT: CPT | Mod: ER

## 2020-02-29 PROCEDURE — 93010 EKG 12-LEAD: ICD-10-PCS | Mod: ,,, | Performed by: INTERNAL MEDICINE

## 2020-02-29 RX ORDER — LISINOPRIL 20 MG/1
20 TABLET ORAL
Status: DISCONTINUED | OUTPATIENT
Start: 2020-02-29 | End: 2020-02-29

## 2020-02-29 RX ORDER — KETOROLAC TROMETHAMINE 30 MG/ML
15 INJECTION, SOLUTION INTRAMUSCULAR; INTRAVENOUS
Status: COMPLETED | OUTPATIENT
Start: 2020-02-29 | End: 2020-02-29

## 2020-02-29 RX ADMIN — KETOROLAC TROMETHAMINE 15 MG: 30 INJECTION, SOLUTION INTRAMUSCULAR at 02:02

## 2020-02-29 NOTE — DISCHARGE INSTRUCTIONS
Keep a daily blood pressure log.  If your pressure is consistently running greater than 140/90, return to ER on either Wednesday 3/4/2020 or Thursday 3/5/2020 for re-evaluation if you are unable to get a close follow-up with Dr. Harris.

## 2020-02-29 NOTE — ED PROVIDER NOTES
"Encounter Date: 2/29/2020       History     Chief Complaint   Patient presents with    Dizziness     STATES HIS HEAD FEELS "LIKE HES ON A CLOUD"; REPORTS IT MAY BE HIS BLOOD PRESSURE; INTERMITTENT SYMPTOMS FOR 3 WEEKS     The history is provided by the patient. No  was used.   Hypertension    This is a new problem. The current episode started today. The problem has been unchanged. Associated symptoms include dizziness. Pertinent negatives include no chest pain, no palpitations, no confusion, no headaches, no neck pain and no shortness of breath. 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  Patient reports that he has not been drinking an adequate amount of water.  Has not been on his blood pressure medicine for over a year.     Review of patient's allergies indicates:  No Known Allergies  Past Medical History:   Diagnosis Date    Hypertension     Obesity     Sleep apnea      Past Surgical History:   Procedure Laterality Date    hypoxemia 2016    hospitalized for 4day    TONSILLECTOMY      as child     Family History   Problem Relation Age of Onset    Seizures Mother     Hypertension Father     Diabetes Paternal Grandmother     Constipation Daughter     Alzheimer's disease Maternal Grandmother     Prostate cancer Maternal Grandfather      Social History     Tobacco Use    Smoking status: Current Some Day Smoker     Packs/day: 0.50     Years: 10.00     Pack years: 5.00     Types: Cigarettes    Smokeless tobacco: Former User   Substance Use Topics    Alcohol use: No     Comment: socially    Drug use: No     Review of Systems   Constitutional: Negative.  Negative for activity change, chills, diaphoresis and fever.   HENT: Negative.  Negative for congestion, nosebleeds, rhinorrhea, sinus pressure, sinus pain, sore throat and trouble swallowing.    Eyes: Negative.  Negative for pain, discharge, redness and itching. "   Respiratory: Negative.  Negative for cough, chest tightness, shortness of breath, wheezing and stridor.    Cardiovascular: Negative.  Negative for chest pain, palpitations and leg swelling.   Gastrointestinal: Negative.  Negative for abdominal pain, constipation, diarrhea, nausea and vomiting.   Endocrine: Negative.  Negative for cold intolerance, heat intolerance, polydipsia, polyphagia and polyuria.   Genitourinary: Negative.  Negative for difficulty urinating, discharge, dysuria, frequency, genital sores, penile pain, scrotal swelling and testicular pain.   Musculoskeletal: Negative.  Negative for back pain, gait problem, joint swelling and neck pain.   Skin: Negative.  Negative for color change, rash and wound.   Allergic/Immunologic: Negative.    Neurological: Positive for dizziness and light-headedness. Negative for tremors, seizures, weakness and headaches.   Hematological: Negative.    Psychiatric/Behavioral: Negative.  Negative for agitation, behavioral problems, confusion, hallucinations, self-injury and suicidal ideas. The patient is not nervous/anxious and is not hyperactive.    All other systems reviewed and are negative.      Physical Exam     Initial Vitals [02/29/20 1257]   BP Pulse Resp Temp SpO2   (!) 188/87 65 18 97.7 °F (36.5 °C) 99 %      MAP       --         Physical Exam    Nursing note and vitals reviewed.  Constitutional: He appears well-developed and well-nourished. He is not diaphoretic.  Non-toxic appearance. He does not appear ill. No distress.   HENT:   Head: Normocephalic and atraumatic.   Mouth/Throat: Oropharynx is clear and moist. No oropharyngeal exudate.   Eyes: Conjunctivae and EOM are normal. Pupils are equal, round, and reactive to light.   Neck: Normal range of motion. Neck supple.   Cardiovascular: Normal rate, regular rhythm, normal heart sounds and intact distal pulses. Exam reveals no gallop and no friction rub.    No murmur heard.  Pulmonary/Chest: Breath sounds normal.  No respiratory distress. He has no wheezes. He has no rhonchi. He has no rales. He exhibits no tenderness.   Abdominal: Soft. Normal appearance and bowel sounds are normal. He exhibits no shifting dullness, no distension, no pulsatile liver, no fluid wave, no abdominal bruit, no ascites, no pulsatile midline mass and no mass. There is no hepatosplenomegaly. There is no tenderness. There is no rebound and no guarding. No hernia.   Musculoskeletal: Normal range of motion.   Lymphadenopathy:     He has no cervical adenopathy.   Neurological: He is alert and oriented to person, place, and time. He has normal strength and normal reflexes. He is not disoriented. He displays no atrophy, no tremor and normal reflexes. No cranial nerve deficit or sensory deficit. He exhibits normal muscle tone. He displays a negative Romberg sign. He displays no seizure activity. Coordination and gait normal. GCS eye subscore is 4. GCS verbal subscore is 5. GCS motor subscore is 6.   Skin: Skin is warm and dry. Capillary refill takes less than 2 seconds. No rash noted.   Psychiatric: He has a normal mood and affect. His behavior is normal. Judgment and thought content normal.         ED Course   Procedures  Labs Reviewed   TROPONIN ISTAT   POCT CBC   POCT URINALYSIS W/O SCOPE   POCT URINALYSIS W/O SCOPE   POCT CMP   POCT TROPONIN   POCT B-TYPE NATRIURETIC PEPTIDE (BNP)   POCT CMP   POCT B-TYPE NATRIURETIC PEPTIDE (BNP)     EKG Readings: (Independently Interpreted)   Initial Reading: No STEMI. Rhythm: Normal Sinus Rhythm. Heart Rate: 64. Ectopy: No Ectopy. Conduction: Normal. ST Segments: Normal ST Segments. T Waves: Normal. Axis: Normal. Clinical Impression: Normal Sinus Rhythm       Imaging Results          X-Ray Chest PA And Lateral (Final result)  Result time 02/29/20 13:37:05    Final result by Adonis Lancaster MD (02/29/20 13:37:05)                 Impression:      No detrimental change or radiographic acute intrathoracic process  seen.      Electronically signed by: Adonis Lancaster MD  Date:    02/29/2020  Time:    13:37             Narrative:    EXAMINATION:  XR CHEST PA AND LATERAL    CLINICAL HISTORY:  Hypertensive urgency    TECHNIQUE:  PA and lateral views of the chest were performed.    COMPARISON:  Chest radiograph 09/22/2017    FINDINGS:  No detrimental change.The lungs are symmetrically well expanded without large consolidation or new focal opacity.  Pulmonary vasculature and hilar regions are within normal limits.    The cardiac silhouette is normal in size. Mediastinal contours are within normal limits.    Osseous structures appear stable without acute process seen.                               CT Head Without Contrast (Final result)  Result time 02/29/20 13:17:44    Final result by Adonis Lancaster MD (02/29/20 13:17:44)                 Impression:      No acute intracranial abnormality identified.  Specifically, no intracranial hemorrhage.    Empty sella configuration, nonspecific but can be seen with idiopathic intracranial hypertension.  Correlate clinically.      Electronically signed by: Adonis Lancaster MD  Date:    02/29/2020  Time:    13:17             Narrative:    EXAMINATION:  CT HEAD WITHOUT CONTRAST    CLINICAL HISTORY:  Dizziness;hypertensive urgency;    TECHNIQUE:  Low dose axial CT images obtained throughout the head without intravenous contrast. Sagittal and coronal reconstructions were performed.    COMPARISON:  None.    FINDINGS:  Intracranial compartment:    Ventricles and sulci are normal in size for age without evidence of hydrocephalus. No extra-axial blood or fluid collections.  Empty sella configuration, nonspecific.    The brain parenchyma appears normal. No parenchymal mass, hemorrhage, edema or major vascular distribution infarct.    Skull/extracranial contents (limited evaluation): No fracture.  Mild mucosal thickening within the right sphenoid sinus and small retention cyst versus polyp within the left  maxillary sinus and middle right ethmoidal air cell.  Mastoid air cells and remaining imaged paranasal sinuses are essentially clear.    Imaged portions of the orbits are within normal limits.                                 Medical Decision Making:   Initial Assessment:   Hypertensive urgency, dehydration  Differential Diagnosis:   Cardiac abnormality, end-organ damage, electrolyte abnormality, intracranial abnormality  Clinical Tests:   Lab Tests: Reviewed and Ordered  The following lab test(s) were unremarkable: CBC, CMP, Troponin, Lactate, Urinalysis and BNP  Radiological Study: Ordered and Reviewed  ED Management:  Blood work is unremarkable.  EKG is unremarkable.  Chest x-ray is unremarkable. Toradol IV given in the ER for patient's headache. Repeat blood pressure is 111/57.  Patient will be discharged home with instructions to keep a blood pressure log, follow up with Dr. Kenn Harris in 2 days, return to the ER next week if unable to get in with Dr. Harris and patient's blood pressure readings are elevated greater than 140/90, drink 6-8 glasses of water daily or more if going to be in the heat while working to prevent dehydration, and return to the ER sooner as needed if symptoms worsen or fail to improve.  The patient verbalized understanding of discharge instructions and treatment plan.                                 Clinical Impression:       ICD-10-CM ICD-9-CM   1. Hypertensive urgency I16.0 401.9   2. Dehydration E86.0 276.51             ED Disposition Condition    Discharge Stable        ED Prescriptions     None        Follow-up Information     Follow up With Specialties Details Why Contact Info    Micheal Harris MD Internal Medicine, Pediatrics Schedule an appointment as soon as possible for a visit in 2 days to establish primary care [address] [place] [address]  [phone]      [facility] Emergency Department Emergency Medicine Go to  As needed, If symptoms worsen [address]  [facility] [address]  [phone]                                     Toussaint Battley III, [facility]  02/29/20 8615

## 2020-03-02 ENCOUNTER — OFFICE VISIT (OUTPATIENT)
Dept: INTERNAL MEDICINE | Facility: CLINIC | Age: 30
End: 2020-03-02
Attending: FAMILY MEDICINE
Payer: COMMERCIAL

## 2020-03-02 ENCOUNTER — TELEPHONE (OUTPATIENT)
Dept: FAMILY MEDICINE | Facility: CLINIC | Age: 30
End: 2020-03-02

## 2020-03-02 ENCOUNTER — NURSE TRIAGE (OUTPATIENT)
Dept: ADMINISTRATIVE | Facility: CLINIC | Age: 30
End: 2020-03-02

## 2020-03-02 VITALS
OXYGEN SATURATION: 97 % | DIASTOLIC BLOOD PRESSURE: 74 MMHG | SYSTOLIC BLOOD PRESSURE: 136 MMHG | BODY MASS INDEX: 41.75 KG/M2 | TEMPERATURE: 98 F | HEIGHT: 73 IN | WEIGHT: 315 LBS | HEART RATE: 77 BPM

## 2020-03-02 DIAGNOSIS — Z87.891 PERSONAL HISTORY OF NICOTINE DEPENDENCE: ICD-10-CM

## 2020-03-02 DIAGNOSIS — I10 HYPERTENSION, ESSENTIAL: Primary | ICD-10-CM

## 2020-03-02 DIAGNOSIS — M72.2 PLANTAR FASCIITIS: ICD-10-CM

## 2020-03-02 DIAGNOSIS — J45.30 MILD PERSISTENT ASTHMA WITHOUT COMPLICATION: ICD-10-CM

## 2020-03-02 DIAGNOSIS — J45.909 ASTHMA, UNSPECIFIED ASTHMA SEVERITY, UNSPECIFIED WHETHER COMPLICATED, UNSPECIFIED WHETHER PERSISTENT: ICD-10-CM

## 2020-03-02 DIAGNOSIS — J45.20 MILD INTERMITTENT ASTHMA, UNSPECIFIED WHETHER COMPLICATED: ICD-10-CM

## 2020-03-02 DIAGNOSIS — R51.9 NONINTRACTABLE HEADACHE, UNSPECIFIED CHRONICITY PATTERN, UNSPECIFIED HEADACHE TYPE: ICD-10-CM

## 2020-03-02 PROCEDURE — 3078F DIAST BP <80 MM HG: CPT | Mod: CPTII,S$GLB,, | Performed by: FAMILY MEDICINE

## 2020-03-02 PROCEDURE — 99999 PR PBB SHADOW E&M-EST. PATIENT-LVL IV: ICD-10-PCS | Mod: PBBFAC,,, | Performed by: FAMILY MEDICINE

## 2020-03-02 PROCEDURE — 3078F PR MOST RECENT DIASTOLIC BLOOD PRESSURE < 80 MM HG: ICD-10-PCS | Mod: CPTII,S$GLB,, | Performed by: FAMILY MEDICINE

## 2020-03-02 PROCEDURE — 3075F PR MOST RECENT SYSTOLIC BLOOD PRESS GE 130-139MM HG: ICD-10-PCS | Mod: CPTII,S$GLB,, | Performed by: FAMILY MEDICINE

## 2020-03-02 PROCEDURE — 99204 PR OFFICE/OUTPT VISIT, NEW, LEVL IV, 45-59 MIN: ICD-10-PCS | Mod: S$GLB,,, | Performed by: FAMILY MEDICINE

## 2020-03-02 PROCEDURE — 99999 PR PBB SHADOW E&M-EST. PATIENT-LVL IV: CPT | Mod: PBBFAC,,, | Performed by: FAMILY MEDICINE

## 2020-03-02 PROCEDURE — 3075F SYST BP GE 130 - 139MM HG: CPT | Mod: CPTII,S$GLB,, | Performed by: FAMILY MEDICINE

## 2020-03-02 PROCEDURE — 99204 OFFICE O/P NEW MOD 45 MIN: CPT | Mod: S$GLB,,, | Performed by: FAMILY MEDICINE

## 2020-03-02 PROCEDURE — 3008F BODY MASS INDEX DOCD: CPT | Mod: CPTII,S$GLB,, | Performed by: FAMILY MEDICINE

## 2020-03-02 PROCEDURE — 3008F PR BODY MASS INDEX (BMI) DOCUMENTED: ICD-10-PCS | Mod: CPTII,S$GLB,, | Performed by: FAMILY MEDICINE

## 2020-03-02 RX ORDER — FLUTICASONE PROPIONATE 50 MCG
2 SPRAY, SUSPENSION (ML) NASAL DAILY
Qty: 15.8 ML | Refills: 0 | Status: SHIPPED | OUTPATIENT
Start: 2020-03-02 | End: 2020-03-31

## 2020-03-02 RX ORDER — BUDESONIDE AND FORMOTEROL FUMARATE DIHYDRATE 160; 4.5 UG/1; UG/1
2 AEROSOL RESPIRATORY (INHALATION) EVERY 12 HOURS
Qty: 1 INHALER | Refills: 0 | Status: SHIPPED | OUTPATIENT
Start: 2020-03-02

## 2020-03-02 RX ORDER — ALBUTEROL SULFATE 90 UG/1
2 AEROSOL, METERED RESPIRATORY (INHALATION) EVERY 6 HOURS PRN
Qty: 18 G | Refills: 0 | Status: SHIPPED | OUTPATIENT
Start: 2020-03-02 | End: 2020-03-16

## 2020-03-02 RX ORDER — LISINOPRIL 20 MG/1
20 TABLET ORAL DAILY
Qty: 30 TABLET | Refills: 1 | Status: SHIPPED | OUTPATIENT
Start: 2020-03-02 | End: 2020-03-20 | Stop reason: SDUPTHER

## 2020-03-02 NOTE — TELEPHONE ENCOUNTER
Sat felt like he was going to pass out went to nurses station at work BP [id number]. He then went to the Er and BP was 188/100. Pt called to make his appointment to establish a PCP as reccommended by er Md. Pt stated he wasn't having any new symptoms. Pt connected to appointment desk.     Reason for Disposition   [1] Follow-up call to recent contact AND [2] information only call, no triage required    Protocols used: INFORMATION ONLY CALL-A-

## 2020-03-02 NOTE — TELEPHONE ENCOUNTER
----- Message from Merari Sood sent at 3/2/2020  7:28 AM CST -----  Contact: Pt  Appointment Access:    Pt called to schedule a New Pt ER Follow Up visit to establish care and was instructed to schedule within 2 days for elevated blood pressure and migraine. Pt was seen in the ER on 2/29/20. Pt can be reached at [phone]

## 2020-03-02 NOTE — TELEPHONE ENCOUNTER
Spoke with patient and he said that he was trying to get in today to establish care with Dr Harris for elevated blood pressure. Patient said that he has appointment already schedule with a provider at [facility]. Informed him that Dr Harris next availability is on 3/3/2020. Patient said that he will keep appointment for establish care with new provider at [facility] for today.

## 2020-03-02 NOTE — PATIENT INSTRUCTIONS
Schedule in 2-4 weeks to establish care with new PCP.    Information about cholesterol, high blood pressure and healthy diet and activity recommendations can be found at the following links on the Internet:    http://www.nhlbi.nih.gov/health/health-topics/topics/hbc  http://www.nhlbi.nih.gov/health/educational/lose_wt/index.htm  Http://www.nhlbi.nih.gov/files/docs/public/heart/hbp_low.pdf  http://www.heart.org/HEARTORG/  http://diabetes.org/  https://www.cdc.gov/  Https://healthfinder.gov/  https://health.gov/dietaryguidelines/2015/guidelines/  https://health.gov/paguidelines/second-edition/pdf/Physical_Activity_Guidelines_2nd_edition.pdf

## 2020-03-02 NOTE — PROGRESS NOTES
Subjective:       Patient ID: Orville Erwin is a 29 y.o. male.    Chief Complaint: Hypertension    Patient presents for a post hospitalization follow up visit. Current complaints addressed in the ROS section. Reviewed the pertinent chart data including (but not limited to) labs, imaging, cardiovascular, procedures and available ER/DC Summary and inpatient provider notes.        Review of Systems   Constitutional: Negative for chills, fatigue, fever and unexpected weight change.   HENT: Positive for congestion. Negative for trouble swallowing.    Eyes: Negative for redness and visual disturbance.   Respiratory: Negative for cough, chest tightness and shortness of breath.    Cardiovascular: Negative for chest pain, palpitations and leg swelling.   Gastrointestinal: Negative for abdominal pain and blood in stool.   Genitourinary: Negative for difficulty urinating and hematuria.   Musculoskeletal: Positive for arthralgias. Negative for back pain, gait problem, joint swelling, myalgias and neck pain.   Skin: Negative for color change and rash.   Neurological: Positive for light-headedness and headaches. Negative for tremors, speech difficulty, weakness and numbness.   Hematological: Negative for adenopathy. Does not bruise/bleed easily.   Psychiatric/Behavioral: Negative for behavioral problems, confusion and sleep disturbance. The patient is not nervous/anxious.        Objective:      Physical Exam   Constitutional: He is oriented to person, place, and time. He appears well-developed and well-nourished. No distress.   HENT:   Head: Normocephalic.   Right Ear: Tympanic membrane, external ear and ear canal normal.   Left Ear: Tympanic membrane, external ear and ear canal normal.   Nose: Nose normal.   Mouth/Throat: Uvula is midline, oropharynx is clear and moist and mucous membranes are normal. No oropharyngeal exudate.   Eyes: Conjunctivae are normal. Right eye exhibits no discharge. Left eye exhibits no  discharge. No scleral icterus.   Neck: Normal range of motion. Neck supple. No thyromegaly present.   Cardiovascular: Normal rate, regular rhythm, normal heart sounds and intact distal pulses. Exam reveals no gallop and no friction rub.   No murmur heard.  Pulmonary/Chest: Effort normal. No respiratory distress. He has no wheezes. He has no rales. He exhibits no tenderness.   Abdominal: Soft. There is no tenderness.   Musculoskeletal: He exhibits no edema.   Lymphadenopathy:     He has no cervical adenopathy.   Neurological: He is alert and oriented to person, place, and time.   Skin: Skin is warm and dry. No rash noted. He is not diaphoretic.   Nursing note and vitals reviewed.      Assessment:       1. Hypertension, essential    2. Personal history of nicotine dependence    3. Asthma, unspecified asthma severity, unspecified whether complicated, unspecified whether persistent    4. Nonintractable headache, unspecified chronicity pattern, unspecified headache type    5. Mild intermittent asthma, unspecified whether complicated    6. Mild persistent asthma without complication    7. Plantar fasciitis        Plan:     Medication List with Changes/Refills   Changed and/or Refilled Medications    Modified Medication Previous Medication    ALBUTEROL (PROVENTIL/VENTOLIN HFA) 90 MCG/ACTUATION INHALER albuterol 90 mcg/actuation inhaler       Inhale 2 puffs into the lungs every 6 (six) hours as needed for Wheezing or Shortness of Breath. Rescue    Inhale 2 puffs into the lungs every 4 (four) hours as needed for Wheezing or Shortness of Breath. Rescue    BUDESONIDE-FORMOTEROL 160-4.5 MCG (SYMBICORT) 160-4.5 MCG/ACTUATION HFAA budesonide-formoterol 160-4.5 mcg (SYMBICORT) 160-4.5 mcg/actuation HFAA       Inhale 2 puffs into the lungs every 12 (twelve) hours. Controller    Inhale 2 puffs into the lungs every 12 (twelve) hours. Controller    FLUTICASONE PROPIONATE (FLONASE) 50 MCG/ACTUATION NASAL SPRAY fluticasone (FLONASE) 50  mcg/actuation nasal spray       2 sprays (100 mcg total) by Each Nostril route once daily.    2 sprays (100 mcg total) by Each Nare route once daily.    LISINOPRIL (PRINIVIL,ZESTRIL) 20 MG TABLET lisinopril (PRINIVIL,ZESTRIL) 20 MG tablet       Take 1 tablet (20 mg total) by mouth once daily.    Take 1 tablet (20 mg total) by mouth once daily.   Discontinued Medications    IBUPROFEN (ADVIL,MOTRIN) 800 MG TABLET    TAKE 1 TABLET BY MOUTH TWICE DAILY AS NEEDED FOR  FOOT  PAIN     Orville was seen today for hypertension.    Diagnoses and all orders for this visit:    Hypertension, essential    Personal history of nicotine dependence    Asthma, unspecified asthma severity, unspecified whether complicated, unspecified whether persistent    Nonintractable headache, unspecified chronicity pattern, unspecified headache type    Mild intermittent asthma, unspecified whether complicated  -     albuterol (PROVENTIL/VENTOLIN HFA) 90 mcg/actuation inhaler; Inhale 2 puffs into the lungs every 6 (six) hours as needed for Wheezing or Shortness of Breath. Rescue    Mild persistent asthma without complication  -     budesonide-formoterol 160-4.5 mcg (SYMBICORT) 160-4.5 mcg/actuation HFAA; Inhale 2 puffs into the lungs every 12 (twelve) hours. Controller    Plantar fasciitis    Other orders  -     lisinopril (PRINIVIL,ZESTRIL) 20 MG tablet; Take 1 tablet (20 mg total) by mouth once daily.  -     fluticasone propionate (FLONASE) 50 mcg/actuation nasal spray; 2 sprays (100 mcg total) by Each Nostril route once daily.      See meds, orders, follow up, routing and instructions sections of encounter and AVS. Discussed with patient and provided on AVS.    Discussed diet and exercise and links provided on AVS for detailed information.    This is a new patient placed in an urgent care visit concerning a hospital follow-up.  Felt ill or dysphoric with the dizziness and headache at work over the weekend.  Went to urgent care.  Diagnosed with  hypertensive urgency.  Patient has been off of his blood pressure medications for many months.  No chest pain or significant dyspnea at this time.  Medical history was reviewed.  History of asthma.  Minimal symptoms at this time.  Out of those medications as well.  Other complaints include heel pain, plantar fasciitis.  Also with varicose veins in the legs.  No calf pain or swelling, however.  That has not changed over time.    Headache, 3 weeks.  Right side.  No focal neurologic deficits.  Had CT scan in the emergency room, normal.    He requested a refill of ibuprofen 800 mg.  I discussed the interaction between nonsteroidal anti-inflammatory and ACE-inhibitor.  I do not recommend refilling ibuprofen at this time.  Also discussed rebound headache.  Patient has not been taking these on a regular basis.  Discussed Tylenol for musculoskeletal pain and headache at this time.  Go to ER for significant worsening.    Reviewed patient's emergency room laboratory including CMP, urinalysis, BNP and troponin.  Will restart his lisinopril.  Recommended he get a new PCP, he does not want to follow through with residency Clinic at this time.  I let him know that our panel is now closed.  He stated he would like to establish with permanent staff. Recommended a 2-4 week follow-up.  The patient requested a return to work note for tomorrow.  Since his pressure is now under control I think that is a reasonable request.    Also stated he had a new 3-week-old baby.  Some sleep disturbance.  Also recommended he avoid OTC aspirin containing products at this time.    Discussion concerning inhaler proper use.  Rescue versus control or.

## 2020-03-04 ENCOUNTER — DOCUMENTATION ONLY (OUTPATIENT)
Dept: INTERNAL MEDICINE | Facility: CLINIC | Age: 30
End: 2020-03-04

## 2020-03-04 NOTE — PROGRESS NOTES
Refill Authorization Note     is requesting a refill authorization.    Brief assessment and rationale for refill: ROUTE: npp          Medication Therapy Plan: Non-participating provider, route                              Comments:                       Refill Routing Note     Medication(s) are not appropriate for processing by [facility]:    Non Participating Provider in Refill Center     Appointments  past 12m or future 3m with PCP    Date Provider   Last Visit   3/2/2020 Juan M Rangel MD   Next Visit   Visit date not found Juan M Rangel MD           Automatic Epic Protocol Generated Data:    Requested Prescriptions   Pending Prescriptions Disp Refills    albuterol (PROVENTIL/VENTOLIN HFA) 90 mcg/actuation inhaler [Pharmacy Med Name: ALBUTEROL HFA INH (200 PUFFS) 18GM] 54 g      Sig: INHALE 2 PUFFS INTO THE LUNGS EVERY 6 HOURS AS NEEDED FOR WHEEZING OR SHORTNESS OF BREATH       Pulmonology:  Beta Agonists Failed - 3/4/2020 10:35 AM        Failed - Manual Review: If patient with asthma requests more than 1 inhaler every 3 months, assess for uncontrolled symptoms and/or notify provider.        Passed - Patient is at least 18 years old        Passed - Patient has asthma or COPD and there is a controller medication on the active medication list        Passed - Last Heart Rate in normal range within 360 days.     Pulse Readings from Last 3 Encounters:   03/02/20 77   02/29/20 75   10/29/19 95             Passed - Office visit in past 12 months or future 90 days.     Recent Outpatient Visits            2 days ago Hypertension, essential    [facility] Juan M Rangel MD    1 year ago Nicotine use disorder    [facility] Lilia Ch NP    1 year ago Plantar fasciitis of left foot     [facility] - Internal Medicine Elijah Alfred MD    1 year ago CHARLY on CPAP    [facility] [place] [address] Shamir Allen NP    2 years  ago Epigastric abdominal pain    [facility] German Dsouza MD          Future Appointments              In 1 month MD [facility] - Internal Medicine, [facility]                   Note composed:10:36 AM 03/04/2020

## 2020-03-16 RX ORDER — ALBUTEROL SULFATE 90 UG/1
AEROSOL, METERED RESPIRATORY (INHALATION)
Qty: 54 G | Refills: 3 | OUTPATIENT
Start: 2020-03-16 | End: 2022-05-11

## 2020-03-20 ENCOUNTER — TELEPHONE (OUTPATIENT)
Dept: INTERNAL MEDICINE | Facility: CLINIC | Age: 30
End: 2020-03-20

## 2020-03-20 ENCOUNTER — NURSE TRIAGE (OUTPATIENT)
Dept: ADMINISTRATIVE | Facility: CLINIC | Age: 30
End: 2020-03-20

## 2020-03-20 RX ORDER — LISINOPRIL 20 MG/1
20 TABLET ORAL DAILY
Qty: 30 TABLET | Refills: 0 | Status: SHIPPED | OUTPATIENT
Start: 2020-03-20 | End: 2020-03-24

## 2020-03-20 NOTE — TELEPHONE ENCOUNTER
----- Message from Madhu Samaniego sent at 3/20/2020  1:44 PM CDT -----  Contact: Pt [phone]  The patient said the budesonide-formoterol 160-4.5 mcg (SYMBICORT) 160-4.5 mcg/actuation HFAA is too expensive. Please prescribe and alternative.    [facility] [address] [place] [address] [place] AT [facility] [phone] (Phone) [phone] (Fax)

## 2020-03-20 NOTE — TELEPHONE ENCOUNTER
----- Message from Madhu Samaniego sent at 3/20/2020  1:35 PM CDT -----  Contact: Pt [phone]  Is this a refill or new RX:  Refill  RX name and strength: lisinopril (PRINIVIL,ZESTRIL) 20 MG tablet  Directions (copy/paste from chart):    Is this a 30 day or 90 day RX:  90  Local pharmacy or mail order pharmacy:    [facility] name and phone # [id number] [place] [address] [place] AT [facility] [phone] (Phone)  [phone] (Fax)

## 2020-03-20 NOTE — TELEPHONE ENCOUNTER
Pt has had SOB x 1 month. Sen previously and prescribed medication, but did not  medication due to cost. Pt currently with no PCP, has an initial visit scheduled in April. NAD noted on line. Pt denies fever,  CP body aches. Provided with [facility]  number in attempts to see a PCP sooner.   Reason for Disposition   Health Information question, no triage required and triager able to answer question    Additional Information   Negative: [1] Caller is not with the adult (patient) AND [2] reporting urgent symptoms   Negative: Lab result questions   Negative: Medication questions   Negative: Caller can't be reached by phone   Negative: Caller has already spoken to PCP or another triager   Negative: Requesting regular office appointment   Negative: [1] Caller requesting NON-URGENT health information AND [2] PCP's office is the best resource    Protocols used: INFORMATION ONLY CALL-A-

## 2020-03-21 RX ORDER — FLUTICASONE PROPIONATE AND SALMETEROL 250; 50 UG/1; UG/1
1 POWDER RESPIRATORY (INHALATION) 2 TIMES DAILY
Qty: 60 EACH | Refills: 5 | Status: SHIPPED | OUTPATIENT
Start: 2020-03-21 | End: 2020-05-04 | Stop reason: SDUPTHER

## 2020-03-21 NOTE — TELEPHONE ENCOUNTER
Please advise him that he can substitute adviar discus, 1 inhalation, twice a day. Thank you.    I sent it in.

## 2020-03-24 RX ORDER — LISINOPRIL 20 MG/1
TABLET ORAL
Qty: 90 TABLET | Refills: 0 | Status: SHIPPED | OUTPATIENT
Start: 2020-03-24 | End: 2020-04-21 | Stop reason: SDUPTHER

## 2020-03-31 NOTE — TELEPHONE ENCOUNTER
Refill Authorization Note     is requesting a refill authorization.    Brief assessment and rationale for refill: DEFER: not yet established with PCP          Medication Therapy Plan: Pt seen 3/2/20 for hospital f/u with Provider. Has appointment to establish care with JASIEL Gaines MD 4/14/20. Defer refill request to you.                              Comments:   Refill Center Care Gap Closure protocols temporarily suspended.   Requested Prescriptions   Pending Prescriptions Disp Refills    fluticasone propionate (FLONASE) 50 mcg/actuation nasal spray [Pharmacy Med Name: FLUTICASONE 50MCG NASAL SP (120) RX] 16 g      Sig: SHAKE LIQUID AND USE 2 SPRAYS(100 MCG) IN EACH NOSTRIL EVERY DAY       Ear, Nose, and Throat: Nasal Preparations - Corticosteroids Failed - 3/31/2020  3:17 PM        Failed - An appropriate indication is on the problem list     Allergic Rhinitis  Sinusitis  Seasonal Allergies              Passed - Patient is at least 18 years old        Passed - Office visit in past 12 months or future 90 days.     Recent Outpatient Visits            4 weeks ago Hypertension, essential    [facility] Juan M Rangel MD    1 year ago Nicotine use disorder    [facility] Lilia Ch NP    1 year ago Plantar fasciitis of left foot    [facility] Elijah Alfred MD    1 year ago CHARLY on CPAP    [facility]-[id number] Shamir Allen, NP    2 years ago Epigastric abdominal pain    [facility] German Dsouza MD          Future Appointments              In 2 weeks Reese Gaines MD [facility], [facility]                 Appointments  past 12m or future 3m with PCP    Date Provider   Last Visit   3/2/2020 Juan M Rangel MD   Next Visit   Visit date not found Juan M Rangel MD   .  ED visits in past 90 days: [unfilled]       Note composed:3:35 PM 03/31/2020

## 2020-04-01 RX ORDER — FLUTICASONE PROPIONATE 50 MCG
SPRAY, SUSPENSION (ML) NASAL
Qty: 16 G | Refills: 0 | Status: SHIPPED | OUTPATIENT
Start: 2020-04-01 | End: 2020-04-21 | Stop reason: SDUPTHER

## 2020-04-06 NOTE — PROGRESS NOTES
Refill Routing Note     Medication(s) are not appropriate for processing by [facility]:    Non Participating Provider in Refill Center     Appointments  past 12m or future 3m with PCP    Date Provider   Last Visit   Visit date not found Tram Johns MD   Next Visit   Visit date not found Tram Johns MD           Automatic Epic Protocol Generated Data:    Requested Prescriptions   Pending Prescriptions Disp Refills    fluticasone propionate (FLONASE) 50 mcg/actuation nasal spray [Pharmacy Med Name: FLUTICASONE 50MCG NASAL SP (120) RX] 48 g      Sig: SHAKE LIQUID AND USE 2 SPRAYS(100 MCG) IN EACH NOSTRIL EVERY DAY       Ear, Nose, and Throat: Nasal Preparations - Corticosteroids Failed - 4/1/2020 11:46 AM        Failed - An appropriate indication is on the problem list     Allergic Rhinitis  Sinusitis  Seasonal Allergies              Passed - Patient is at least 18 years old        Passed - Office visit in past 12 months or future 90 days.     Recent Outpatient Visits            1 month ago Hypertension, essential    [facility] - Internal Medicine Juan M Rangel MD    1 year ago Nicotine use disorder    [facility] Pul[facility] Lilia Ch NP    1 year ago Plantar fasciitis of left foot    [facility] Elijah Alfred MD    1 year ago CHARLY on CPAP    [facility]-[id number] Shamir Allen, NP    2 years ago Epigastric abdominal pain    [facility] German Dsouza MD          Future Appointments              In 1 week Reese Gaines MD [facility] - Internal Medicine,  [facility]                Powered by [employer] - 4/1/2020 11:46 AM        This is a duplicate request of medication ordered 2020-04-01. Check to see if the patient is requesting a refill from a new pharmacy.           Note composed:12:00 PM 04/06/2020

## 2020-04-20 RX ORDER — FLUTICASONE PROPIONATE 50 MCG
SPRAY, SUSPENSION (ML) NASAL
Qty: 48 G | OUTPATIENT
Start: 2020-04-20

## 2020-04-21 RX ORDER — LISINOPRIL 20 MG/1
TABLET ORAL
Qty: 90 TABLET | Refills: 0 | Status: SHIPPED | OUTPATIENT
Start: 2020-04-21 | End: 2020-04-30 | Stop reason: SDUPTHER

## 2020-04-21 RX ORDER — FLUTICASONE PROPIONATE 50 MCG
SPRAY, SUSPENSION (ML) NASAL
Qty: 16 G | Refills: 0 | Status: SHIPPED | OUTPATIENT
Start: 2020-04-21 | End: 2020-04-30 | Stop reason: SDUPTHER

## 2020-04-27 NOTE — PROGRESS NOTES
Refill Routing Note   Medication(s) are not appropriate for processing by [facility]:       Non-participating provider               Medication reconciliation completed: No      Automatic Epic Protocol Generated Data:    Requested Prescriptions   Pending Prescriptions Disp Refills    fluticasone propionate (FLONASE) 50 mcg/actuation nasal spray [Pharmacy Med Name: FLUTICASONE 50MCG NASAL SP (120) RX] 48 g      Sig: SHAKE LIQUID AND USE 2 SPRAYS(100 MCG) IN EACH NOSTRIL EVERY DAY       Ear, Nose, and Throat: Nasal Preparations - Corticosteroids Failed - 4/21/2020  6:53 PM        Failed - An appropriate indication is on the problem list     Allergic Rhinitis  Sinusitis  Seasonal Allergies              Passed - Patient is at least 18 years old        Passed - Office visit in past 12 months or future 90 days.     Recent Outpatient Visits            1 month ago Hypertension, essential    [facility] - Internal Medicine Juan M Rangel MD    1 year ago Nicotine use disorder    [facility] Lilia Ch, SAUL    1 year ago Plantar fasciitis of left foot    [facility] Elijah Alfred MD    2 years ago CHARLY on CPAP    [facility]-[id number] Shamir Allen, NP    2 years ago Epigastric abdominal pain    [facility] Internal [facility] German Dsouza MD          Future Appointments              In 1 month Reese Gaines MD [facility] - Internal Medicine,  [facility]                Powered by [employer] - 4/21/2020  6:53 PM        This is a duplicate request of medication ordered 2020-04-21. Check to see if the patient is requesting a refill from a new pharmacy.           Appointments  past 12m or future 3m with PCP    Date Provider   Last Visit   Visit date not found Tram Johns MD   Next Visit   Visit date not found Tram Johns MD   ED visits in past 90 days: 1     Note composed:1:59 PM 04/27/2020     Note composed:1:59 PM  04/27/2020

## 2020-05-01 RX ORDER — LISINOPRIL 20 MG/1
TABLET ORAL
Qty: 90 TABLET | Refills: 0 | Status: SHIPPED | OUTPATIENT
Start: 2020-05-01 | End: 2020-05-04 | Stop reason: SDUPTHER

## 2020-05-01 RX ORDER — FLUTICASONE PROPIONATE 50 MCG
SPRAY, SUSPENSION (ML) NASAL
Qty: 16 G | Refills: 0 | Status: SHIPPED | OUTPATIENT
Start: 2020-05-01 | End: 2020-05-04

## 2020-05-04 RX ORDER — FLUTICASONE PROPIONATE 50 MCG
2 SPRAY, SUSPENSION (ML) NASAL DAILY
Qty: 48 G | Refills: 1 | OUTPATIENT
Start: 2020-05-04 | End: 2022-05-11

## 2020-05-04 RX ORDER — FLUTICASONE PROPIONATE 50 MCG
SPRAY, SUSPENSION (ML) NASAL
Qty: 48 G | Refills: 1 | Status: SHIPPED | OUTPATIENT
Start: 2020-05-04 | End: 2020-05-04 | Stop reason: SDUPTHER

## 2020-05-04 RX ORDER — FLUTICASONE PROPIONATE AND SALMETEROL 250; 50 UG/1; UG/1
1 POWDER RESPIRATORY (INHALATION) 2 TIMES DAILY
Qty: 60 EACH | Refills: 5 | Status: SHIPPED | OUTPATIENT
Start: 2020-05-04

## 2020-05-04 RX ORDER — LISINOPRIL 20 MG/1
TABLET ORAL
Qty: 90 TABLET | Refills: 0 | Status: SHIPPED | OUTPATIENT
Start: 2020-05-04 | End: 2021-02-05 | Stop reason: SDUPTHER

## 2020-05-04 RX ORDER — LISINOPRIL 20 MG/1
TABLET ORAL
Qty: 90 TABLET | Refills: 0 | Status: SHIPPED | OUTPATIENT
Start: 2020-05-04 | End: 2020-05-04 | Stop reason: SDUPTHER

## 2020-05-07 RX ORDER — FLUTICASONE PROPIONATE 50 MCG
SPRAY, SUSPENSION (ML) NASAL
Qty: 48 G | OUTPATIENT
Start: 2020-05-07

## 2020-05-29 ENCOUNTER — TELEPHONE (OUTPATIENT)
Dept: INTERNAL MEDICINE | Facility: CLINIC | Age: 30
End: 2020-05-29

## 2020-06-02 ENCOUNTER — TELEPHONE (OUTPATIENT)
Dept: INTERNAL MEDICINE | Facility: CLINIC | Age: 30
End: 2020-06-02

## 2021-02-05 ENCOUNTER — HOSPITAL ENCOUNTER (EMERGENCY)
Facility: HOSPITAL | Age: 31
Discharge: HOME | End: 2021-02-05
Attending: EMERGENCY MEDICINE

## 2021-02-05 VITALS
WEIGHT: 315 LBS | BODY MASS INDEX: 46.44 KG/M2 | RESPIRATION RATE: 18 BRPM | OXYGEN SATURATION: 100 % | DIASTOLIC BLOOD PRESSURE: 79 MMHG | HEART RATE: 72 BPM | SYSTOLIC BLOOD PRESSURE: 171 MMHG | TEMPERATURE: 99 F

## 2021-02-05 DIAGNOSIS — M79.604 LEG PAIN, ANTERIOR, RIGHT: ICD-10-CM

## 2021-02-05 DIAGNOSIS — I10 HYPERTENSION, ESSENTIAL: Primary | ICD-10-CM

## 2021-02-05 PROCEDURE — 96372 THER/PROPH/DIAG INJ SC/IM: CPT | Mod: ER

## 2021-02-05 PROCEDURE — 99284 EMERGENCY DEPT VISIT MOD MDM: CPT | Mod: 25,ER

## 2021-02-05 PROCEDURE — 63600175 PHARM REV CODE 636 W HCPCS: Mod: ER | Performed by: EMERGENCY MEDICINE

## 2021-02-05 RX ORDER — METHOCARBAMOL 750 MG/1
750 TABLET, FILM COATED ORAL 3 TIMES DAILY PRN
Qty: 30 TABLET | Refills: 0 | Status: SHIPPED | OUTPATIENT
Start: 2021-02-05 | End: 2023-03-02

## 2021-02-05 RX ORDER — IBUPROFEN 600 MG/1
600 TABLET ORAL EVERY 6 HOURS PRN
Qty: 20 TABLET | Refills: 0 | Status: SHIPPED | OUTPATIENT
Start: 2021-02-05 | End: 2023-03-02

## 2021-02-05 RX ORDER — DEXAMETHASONE SODIUM PHOSPHATE 4 MG/ML
8 INJECTION, SOLUTION INTRA-ARTICULAR; INTRALESIONAL; INTRAMUSCULAR; INTRAVENOUS; SOFT TISSUE
Status: COMPLETED | OUTPATIENT
Start: 2021-02-05 | End: 2021-02-05

## 2021-02-05 RX ORDER — LISINOPRIL 20 MG/1
TABLET ORAL
Qty: 30 TABLET | Refills: 0 | Status: SHIPPED | OUTPATIENT
Start: 2021-02-05

## 2021-02-05 RX ORDER — ACETAMINOPHEN 500 MG
1000 TABLET ORAL EVERY 6 HOURS PRN
Qty: 30 TABLET | Refills: 0 | Status: SHIPPED | OUTPATIENT
Start: 2021-02-05 | End: 2023-03-02

## 2021-02-05 RX ORDER — KETOROLAC TROMETHAMINE 30 MG/ML
30 INJECTION, SOLUTION INTRAMUSCULAR; INTRAVENOUS
Status: COMPLETED | OUTPATIENT
Start: 2021-02-05 | End: 2021-02-05

## 2021-02-05 RX ORDER — DICLOFENAC SODIUM 10 MG/G
2 GEL TOPICAL 4 TIMES DAILY PRN
Qty: 1 TUBE | Refills: 0 | Status: SHIPPED | OUTPATIENT
Start: 2021-02-05 | End: 2023-03-02

## 2021-02-05 RX ADMIN — KETOROLAC TROMETHAMINE 30 MG: 30 INJECTION, SOLUTION INTRAMUSCULAR at 09:02

## 2021-02-05 RX ADMIN — DEXAMETHASONE SODIUM PHOSPHATE 8 MG: 4 INJECTION INTRA-ARTICULAR; INTRALESIONAL; INTRAMUSCULAR; INTRAVENOUS; SOFT TISSUE at 09:02

## 2021-08-12 NOTE — TELEPHONE ENCOUNTER
82-year-old male presents to the ER with complaints of abdominal pressure burning with urination and discomfort with bowel movements    Patient was diagnosed with prostatitis and was placed on a course of Bactrim. He states that those antibiotics did not resolve his issue and he was switched to Cipro which he is now taking twice a day. He is also been on Flomax. Had urinary retention 2 days ago and had a catheter placed in the ER. Returns today due to burning as well as feeling of fullness in his lower abdomen. Also states a fair amount of discomfort when he has a bowel movement. The history is provided by the patient. Urinary Catheter Problem   This is a new problem. The current episode started 12 to 24 hours ago. The problem occurs every urination. The problem has been gradually worsening. The quality of the pain is described as burning and aching. The pain is moderate. There has been no fever. He is not sexually active. Associated symptoms include nausea, frequency and abdominal pain. Pertinent negatives include no chills, no vomiting, no flank pain and no back pain. The patient is not pregnant. He has tried antibiotics for the symptoms. His past medical history is significant for urinary catheter problem. Past Medical History:   Diagnosis Date    Family history of malignant neoplasm of gastrointestinal tract     aunt colon cancer age 80    Mixed hyperlipidemia     Personal history of colonic polyps 2015    adenomas    UTI (urinary tract infection) 1/2014       Past Surgical History:   Procedure Laterality Date    HX COLONOSCOPY  2009    Normal. Done in [place].     HX COLONOSCOPY  last 9/18/15    Carlo--cecal and trans TAs--5 year recall         Family History:   Problem Relation Age of Onset    High Cholesterol Maternal Uncle     High Cholesterol Mother        Social History     Socioeconomic History    Marital status:      Spouse name: Not on file    Number of Per Rachelle Brambila pt re-scheduled CPAP set up from 7/12 to 7/24. Pt currently scheduled to f/u with me 8/23, this needs to be postponed CPAP adherence monitoring 31 - 90 days after set up. Will re-schedule pt.    children: Not on file    Years of education: Not on file    Highest education level: Not on file   Occupational History    Not on file   Tobacco Use    Smoking status: Never Smoker    Smokeless tobacco: Never Used   Substance and Sexual Activity    Alcohol use: Yes     Alcohol/week: 1.7 standard drinks     Types: 2 Cans of beer per week     Comment: Moderate - 2 beers and couple glasses of wine per week.  Drug use: No    Sexual activity: Not on file   Other Topics Concern    Not on file   Social History Narrative    Not on file     Social Determinants of Health     Financial Resource Strain:     Difficulty of Paying Living Expenses:    Food Insecurity:     Worried About Running Out of Food in the Last Year:     [address] in the Last Year:    Transportation Needs:     Lack of Transportation (Medical):  Lack of Transportation (Non-Medical):    Physical Activity:     Days of Exercise per Week:     Minutes of Exercise per Session:    Stress:     Feeling of Stress :    Social Connections:     Frequency of Communication with Friends and Family:     Frequency of Social Gatherings with Friends and Family:     Attends Baptism Services:     Active Member of Clubs or Organizations:     Attends Club or Organization Meetings:     Marital Status:    Intimate Partner Violence:     Fear of Current or Ex-Partner:     Emotionally Abused:     Physically Abused:     Sexually Abused: ALLERGIES: Patient has no known allergies. Review of Systems   Constitutional: Negative for activity change, chills, diaphoresis and fever. HENT: Negative for dental problem, hearing loss, nosebleeds, rhinorrhea and sore throat. Eyes: Negative for pain, discharge, redness and visual disturbance. Respiratory: Negative for cough, chest tightness and shortness of breath. Cardiovascular: Negative for chest pain, palpitations and leg swelling.    Gastrointestinal: Positive for abdominal pain and nausea. Negative for constipation, diarrhea and vomiting. Endocrine: Negative for cold intolerance, heat intolerance, polydipsia and polyuria. Genitourinary: Positive for frequency. Negative for dysuria and flank pain. Musculoskeletal: Negative for arthralgias, back pain, joint swelling, myalgias and neck pain. Skin: Negative for pallor and rash. Allergic/Immunologic: Negative for environmental allergies and food allergies. Neurological: Negative for dizziness, tremors, light-headedness, numbness and headaches. Hematological: Negative for adenopathy. Does not bruise/bleed easily. Psychiatric/Behavioral: Negative for confusion and dysphoric mood. The patient is not nervous/anxious and is not hyperactive. All other systems reviewed and are negative. Vitals:    08/12/21 1559   BP: 132/77   Pulse: 80   Resp: 16   Temp: 98 °F (36.7 °C)   SpO2: 97%   Weight: 77.1 kg (170 lb)   Height: 5' 7\" (1.702 m)            Physical Exam  Vitals and nursing note reviewed. Constitutional:       General: He is in acute distress. Appearance: Normal appearance. He is well-developed and normal weight. HENT:      Head: Normocephalic and atraumatic. Right Ear: External ear normal.      Left Ear: External ear normal.      Mouth/Throat:      Mouth: Mucous membranes are moist.      Pharynx: Oropharynx is clear. No oropharyngeal exudate. Eyes:      General: No scleral icterus. Extraocular Movements: Extraocular movements intact. Conjunctiva/sclera: Conjunctivae normal.      Pupils: Pupils are equal, round, and reactive to light. Neck:      Thyroid: No thyromegaly. Vascular: No JVD. Cardiovascular:      Rate and Rhythm: Normal rate and regular rhythm. Pulses: Normal pulses. Heart sounds: Normal heart sounds. No murmur heard. No friction rub. No gallop. Pulmonary:      Effort: Pulmonary effort is normal. No respiratory distress. Breath sounds: Normal breath sounds.  No wheezing. Abdominal:      General: Bowel sounds are normal. There is no distension. Palpations: Abdomen is soft. Tenderness: There is abdominal tenderness in the suprapubic area. Genitourinary:     Penis: Normal.    Musculoskeletal:         General: No tenderness or deformity. Normal range of motion. Cervical back: Normal range of motion and neck supple. Skin:     General: Skin is warm and dry. Capillary Refill: Capillary refill takes less than 2 seconds. Findings: No rash. Neurological:      General: No focal deficit present. Mental Status: He is alert and oriented to person, place, and time. Cranial Nerves: No cranial nerve deficit. Sensory: No sensory deficit. Motor: No abnormal muscle tone. Coordination: Coordination normal.   Psychiatric:         Mood and Affect: Mood normal.         Behavior: Behavior normal.         Thought Content: Thought content normal.         Judgment: Judgment normal.          MDM  Number of Diagnoses or Management Options  Acute prostatitis: established and worsening  Martini catheter problem, initial encounter [facility]): new and requires workup  Diagnosis management comments: 63-year-old male here with concerns over urinary catheter dysfunction and ongoing suprapubic fullness. Nursing staff was able to readjust his catheter and apply a stabilizer for the catheter.   Bladder scan revealed no significant retained urine in the bladder    Patient will be advised to continue his Cipro and Flomax  I will add some pain medication given the discomfort he has with bowel movements    Advised patient call the urologist tomorrow to discuss close follow-up versus waiting for his appointment on 8/20       Amount and/or Complexity of Data Reviewed  Clinical lab tests: reviewed  Tests in the medicine section of CPT®: reviewed  Decide to obtain previous medical records or to obtain history from someone other than the patient: yes  Review and summarize past medical records: yes    Risk of Complications, Morbidity, and/or Mortality  Presenting problems: moderate  Diagnostic procedures: low  Management options: low  General comments: Elements of this note have been dictated via voice recognition software. Text and phrases may be limited by the accuracy of the software. The chart has been reviewed, but errors may still be present.       Patient Progress  Patient progress: improved         Procedures

## 2021-09-23 ENCOUNTER — TELEPHONE (OUTPATIENT)
Dept: SLEEP MEDICINE | Facility: CLINIC | Age: 31
End: 2021-09-23

## 2021-10-11 ENCOUNTER — TELEPHONE (OUTPATIENT)
Dept: SLEEP MEDICINE | Facility: CLINIC | Age: 31
End: 2021-10-11

## 2022-05-10 PROCEDURE — 99284 EMERGENCY DEPT VISIT MOD MDM: CPT | Mod: 25,ER

## 2022-05-10 PROCEDURE — 87502 INFLUENZA DNA AMP PROBE: CPT | Mod: ER

## 2022-05-10 PROCEDURE — 81003 URINALYSIS AUTO W/O SCOPE: CPT | Mod: ER

## 2022-05-10 PROCEDURE — 25000003 PHARM REV CODE 250: Mod: ER | Performed by: EMERGENCY MEDICINE

## 2022-05-10 RX ORDER — IBUPROFEN 400 MG/1
800 TABLET ORAL
Status: COMPLETED | OUTPATIENT
Start: 2022-05-10 | End: 2022-05-10

## 2022-05-10 RX ORDER — ACETAMINOPHEN 500 MG
1000 TABLET ORAL
Status: COMPLETED | OUTPATIENT
Start: 2022-05-10 | End: 2022-05-10

## 2022-05-10 RX ADMIN — ACETAMINOPHEN 1000 MG: 500 TABLET ORAL at 10:05

## 2022-05-10 RX ADMIN — IBUPROFEN 800 MG: 400 TABLET ORAL at 10:05

## 2022-05-11 ENCOUNTER — HOSPITAL ENCOUNTER (EMERGENCY)
Facility: HOSPITAL | Age: 32
Discharge: HOME | End: 2022-05-11
Attending: EMERGENCY MEDICINE
Payer: MEDICAID

## 2022-05-11 VITALS
WEIGHT: 315 LBS | HEART RATE: 81 BPM | BODY MASS INDEX: 41.75 KG/M2 | RESPIRATION RATE: 20 BRPM | DIASTOLIC BLOOD PRESSURE: 68 MMHG | TEMPERATURE: 99 F | OXYGEN SATURATION: 95 % | HEIGHT: 73 IN | SYSTOLIC BLOOD PRESSURE: 121 MMHG

## 2022-05-11 DIAGNOSIS — J10.1 INFLUENZA A: Primary | ICD-10-CM

## 2022-05-11 LAB
BILIRUBIN, POC UA: NEGATIVE
BLOOD, POC UA: ABNORMAL
CLARITY, POC UA: CLEAR
COLOR, POC UA: YELLOW
GLUCOSE, POC UA: ABNORMAL
INFLUENZA A ANTIGEN, POC: POSITIVE
INFLUENZA B ANTIGEN, POC: NEGATIVE
KETONES, POC UA: NEGATIVE
LEUKOCYTE EST, POC UA: NEGATIVE
NITRITE, POC UA: NEGATIVE
PH UR STRIP: 7 [PH]
PROTEIN, POC UA: NEGATIVE
SPECIFIC GRAVITY, POC UA: 1.02
UROBILINOGEN, POC UA: 0.2 E.U./DL

## 2022-05-11 RX ORDER — PROMETHAZINE HYDROCHLORIDE AND DEXTROMETHORPHAN HYDROBROMIDE 6.25; 15 MG/5ML; MG/5ML
5 SYRUP ORAL NIGHTLY PRN
Qty: 118 ML | Refills: 0 | Status: SHIPPED | OUTPATIENT
Start: 2022-05-11 | End: 2022-05-21

## 2022-05-11 RX ORDER — LORATADINE 10 MG/1
10 TABLET ORAL EVERY MORNING
Qty: 60 TABLET | Refills: 0 | Status: SHIPPED | OUTPATIENT
Start: 2022-05-11 | End: 2022-05-25

## 2022-05-11 RX ORDER — ALBUTEROL SULFATE 90 UG/1
2 AEROSOL, METERED RESPIRATORY (INHALATION) EVERY 6 HOURS PRN
Qty: 18 G | Refills: 0 | Status: SHIPPED | OUTPATIENT
Start: 2022-05-11 | End: 2022-05-25

## 2022-05-11 RX ORDER — BENZONATATE 100 MG/1
100 CAPSULE ORAL 3 TIMES DAILY PRN
Qty: 20 CAPSULE | Refills: 0 | Status: SHIPPED | OUTPATIENT
Start: 2022-05-11 | End: 2022-05-21

## 2022-05-11 RX ORDER — FLUTICASONE PROPIONATE 50 MCG
2 SPRAY, SUSPENSION (ML) NASAL DAILY
Qty: 16 G | Refills: 0 | Status: SHIPPED | OUTPATIENT
Start: 2022-05-11 | End: 2022-05-25

## 2022-05-11 RX ORDER — MONTELUKAST SODIUM 10 MG/1
10 TABLET ORAL NIGHTLY
Qty: 30 TABLET | Refills: 0 | Status: SHIPPED | OUTPATIENT
Start: 2022-05-11 | End: 2022-05-25

## 2022-05-11 RX ORDER — OSELTAMIVIR PHOSPHATE 75 MG/1
75 CAPSULE ORAL 2 TIMES DAILY
Qty: 10 CAPSULE | Refills: 0 | Status: SHIPPED | OUTPATIENT
Start: 2022-05-11 | End: 2022-05-16

## 2022-05-11 NOTE — Clinical Note
"Orville Anthony" SidneyJoanie was seen and treated in our emergency department on 5/10/2022.  He may return to work on 05/13/2022.       If you have any questions or concerns, please don't hesitate to call.       RN    "

## 2022-05-11 NOTE — Clinical Note
"Orville"Jaquelin Erwin was seen and treated in our emergency department on 5/10/2022.  He may return to work on 05/18/2022.       If you have any questions or concerns, please don't hesitate to call.      tia PAYTON    "

## 2022-05-11 NOTE — ED PROVIDER NOTES
Encounter Date: 5/10/2022       History     Chief Complaint   Patient presents with    Fever     Pt c/o fever, headache, and generalized body aches since last Sunday     31 y.o. male with elevated BMI, hypertension and CHARLY presents to the emergency department complaining of acute fever (T-max 102.3°) cough, congestion, sore throat, body aches and decreased appetite that began two days ago.  Reports taking Tylenol and Motrin for symptoms.  Reports recent exposure to a family member with similar symptoms.  Denies nausea, vomiting, chest pain, shortness of breath, difficulty swallowing the food intolerance.    The history is provided by the patient.     Review of patient's allergies indicates:  No Known Allergies  Past Medical History:   Diagnosis Date    Hypertension     Obesity     Sleep apnea      Past Surgical History:   Procedure Laterality Date    hypoxemia 2016    hospitalized for 4day    TONSILLECTOMY      as child     Family History   Problem Relation Age of Onset    Seizures Mother     Hypertension Father     Diabetes Paternal Grandmother     Constipation Daughter     Alzheimer's disease Maternal Grandmother     Prostate cancer Maternal Grandfather      Social History     Tobacco Use    Smoking status: Current Some Day Smoker     Packs/day: 0.50     Years: 10.00     Pack years: 5.00     Types: Cigarettes, Cigars    Smokeless tobacco: Former User   Substance Use Topics    Alcohol use: No     Comment: socially    Drug use: No     Review of Systems   Constitutional: Positive for appetite change (decreased) and fever.   HENT: Positive for congestion and sore throat. Negative for trouble swallowing and voice change.    Respiratory: Positive for cough. Negative for shortness of breath.    Cardiovascular: Negative for chest pain.   Gastrointestinal: Negative for diarrhea, nausea and vomiting.   Musculoskeletal: Positive for myalgias.   All other systems reviewed and are negative.      Physical Exam      Initial Vitals [05/10/22 2251]   BP Pulse Resp Temp SpO2   (!) 179/79 (!) 117 20 (!) 103 °F (39.4 °C) 96 %      MAP       --         Physical Exam    Nursing note and vitals reviewed.  Constitutional: He appears well-developed and well-nourished. He is not diaphoretic. He is Obese . No distress.   HENT:   Head: Normocephalic and atraumatic.   Mouth/Throat: Oropharynx is clear and moist.   Eyes: Conjunctivae are normal.   Neck: Phonation normal. No stridor present.   Normal range of motion.  Cardiovascular: Regular rhythm and intact distal pulses.   Pulmonary/Chest: Effort normal. No accessory muscle usage or stridor. No tachypnea. No respiratory distress.   Abdominal: He exhibits no distension. There is no abdominal tenderness.   Musculoskeletal:         General: No tenderness. Normal range of motion.      Cervical back: Normal range of motion.     Neurological: He is alert and oriented to person, place, and time. He has normal strength. Gait normal. GCS eye subscore is 4. GCS verbal subscore is 5. GCS motor subscore is 6.   Skin: Skin is warm and intact.   Psychiatric: He has a normal mood and affect.         ED Course   Procedures  Labs Reviewed   POCT URINALYSIS W/O SCOPE - Abnormal; Notable for the following components:       Result Value    Glucose, UA Trace (*)     Blood, UA Trace-intact (*)     All other components within normal limits   POCT RAPID INFLUENZA A/B - Abnormal; Notable for the following components:    Inflenza A Ag positive (*)     All other components within normal limits   POCT URINALYSIS W/O SCOPE          Imaging Results    None          Medications   acetaminophen tablet 1,000 mg (1,000 mg Oral Given 5/10/22 2258)   ibuprofen tablet 800 mg (800 mg Oral Given 5/10/22 2257)                        Labs Reviewed  Admission on 05/11/2022, Discharged on 05/11/2022   Component Date Value Ref Range Status    Glucose, UA 05/11/2022 Trace (A)  Final    Bilirubin, UA 05/11/2022 Negative   Final     Ketones, UA 05/11/2022 Negative   Final    Spec Grav UA 05/11/2022 1.025   Final    Blood, UA 05/11/2022 Trace-intact (A)  Final    PH, UA 05/11/2022 7.0   Final    Protein, UA 05/11/2022 Negative   Final    Urobilinogen, UA 05/11/2022 0.2  E.U./dL Final    Nitrite, UA 05/11/2022 Negative   Final    Leukocytes, UA 05/11/2022 Negative   Final    Color, UA 05/11/2022 Yellow   Final    Clarity, UA 05/11/2022 Clear   Final    Influenza B Ag 05/11/2022 negative  Positive/Negative Final    Inflenza A Ag 05/11/2022 positive (A) Positive/Negative Final        Imaging Reviewed    Imaging Results    None         Medications given in ED    Medications   acetaminophen tablet 1,000 mg (1,000 mg Oral Given 5/10/22 2258)   ibuprofen tablet 800 mg (800 mg Oral Given 5/10/22 2257)       Note was created using voice recognition software. Note may have occasional typographical errors that may not have been identified and edited despite good nikia initial review prior to signing.    Clinical Impression:   Final diagnoses:  [J10.1] Influenza A (Primary)          ED Disposition Condition    Discharge Stable        ED Prescriptions     Medication Sig Dispense Start Date End Date Auth. Provider    oseltamivir (TAMIFLU) 75 MG capsule Take 1 capsule (75 mg total) by mouth 2 (two) times daily. for 5 days 10 capsule 5/11/2022 5/16/2022 Nessa Warren MD    fluticasone propionate (FLONASE) 50 mcg/actuation nasal spray 2 sprays (100 mcg total) by Each Nostril route once daily. 16 g 5/11/2022  Nessa Warren MD    loratadine (CLARITIN) 10 mg tablet Take 1 tablet (10 mg total) by mouth every morning. 60 tablet 5/11/2022 5/11/2023 Nessa Warren MD    montelukast (SINGULAIR) 10 mg tablet Take 1 tablet (10 mg total) by mouth every evening. 30 tablet 5/11/2022 6/10/2022 Nessa Warren MD    albuterol (PROVENTIL/VENTOLIN HFA) 90 mcg/actuation inhaler Inhale 2 puffs into the lungs every 6 (six) hours as needed for Wheezing or Shortness  of Breath. 18 g 5/11/2022  Nessa Warren MD    benzonatate (TESSALON) 100 MG capsule Take 1 capsule (100 mg total) by mouth 3 (three) times daily as needed for Cough. 20 capsule 5/11/2022 5/21/2022 Nessa Warren MD    promethazine-dextromethorphan (PROMETHAZINE-DM) 6.25-15 mg/5 mL Syrp Take 5 mLs by mouth nightly as needed (cough). 118 mL 5/11/2022 5/21/2022 Nessa Warren MD        Follow-up Information     Follow up With Specialties Details Why Contact Info    Tram Johns MD Internal Medicine Call  As needed, for ongoing care [address] [place] [id number]  [phone]      The nearest emergency department.  Go to  As needed, If symptoms worsen            Nessa Warren MD  05/11/22 0591

## 2022-05-24 ENCOUNTER — OFFICE VISIT (OUTPATIENT)
Dept: SLEEP MEDICINE | Facility: CLINIC | Age: 32
End: 2022-05-24
Payer: MEDICAID

## 2022-05-24 DIAGNOSIS — I10 HYPERTENSION, ESSENTIAL: ICD-10-CM

## 2022-05-24 DIAGNOSIS — G47.34 NOCTURNAL HYPOXEMIA: ICD-10-CM

## 2022-05-24 DIAGNOSIS — G47.33 OSA (OBSTRUCTIVE SLEEP APNEA): Primary | ICD-10-CM

## 2022-05-24 PROCEDURE — 1159F PR MEDICATION LIST DOCUMENTED IN MEDICAL RECORD: ICD-10-PCS | Mod: CPTII,95,, | Performed by: INTERNAL MEDICINE

## 2022-05-24 PROCEDURE — 1159F MED LIST DOCD IN RCRD: CPT | Mod: CPTII,95,, | Performed by: INTERNAL MEDICINE

## 2022-05-24 PROCEDURE — 1160F PR REVIEW ALL MEDS BY PRESCRIBER/CLIN PHARMACIST DOCUMENTED: ICD-10-PCS | Mod: CPTII,95,, | Performed by: INTERNAL MEDICINE

## 2022-05-24 PROCEDURE — 99203 PR OFFICE/OUTPT VISIT, NEW, LEVL III, 30-44 MIN: ICD-10-PCS | Mod: 95,,, | Performed by: INTERNAL MEDICINE

## 2022-05-24 PROCEDURE — 99203 OFFICE O/P NEW LOW 30 MIN: CPT | Mod: 95,,, | Performed by: INTERNAL MEDICINE

## 2022-05-24 PROCEDURE — 1160F RVW MEDS BY RX/DR IN RCRD: CPT | Mod: CPTII,95,, | Performed by: INTERNAL MEDICINE

## 2022-05-24 NOTE — PROGRESS NOTES
Subjective:       Patient ID: Orville Erwin is a 31 y.o. male.    Chief Complaint: Sleeping Problem    I had the pleasure of seeing Orville Erwin today, who is a 31 y.o. male that presents with CHARLY.    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    Orville Erwin presents with CHARLY that has been going on for 21 years.   Severe CHARLY treated with CPAP.   Previously had oxygen bled in.   No longer using oxygen.   Machine currently 15 cm H20.   Machine past 5 years and no longer works.   Feels more rested with therapy.       Bedtime when working ranges from 2400 to 0100.   When not working, bedtime ranges from 2200 to 2300.   Sleep latency ranges from 5 to 10 minutes.     Average number of awakenings is 0 and return to sleep is NA.   Wake up time when working is 0630 to 0630.   When not working, wake up time is 0700 to 0730.   Patient does not feel rested upon awakening.    Orville Erwin consumes approximately <1 beverages with caffeine daily.   An average of 0 beverages with alcohol are consumed    Medications taken for sleep currently: none  Previous medications taken: none     Orville Erwin does not experience daytime sleepiness.   Naps are taken about 0 times weekly, usually lasting NA to NA minutes.  Orville currently does operate an automobile.  Orville Erwin does not experience drowsiness when driving.   Patient does doze off when sedentary.   Orville Erwin does not have auxiliary symptoms of narcolepsy including sleep onset paralysis, hypnagogic hallucinations, sleep attacks and cataplexy  ESS 8.    Orville Erwin has a history of snoring.   Snoring is described as severe and constant.   Apneic episodes have been noticed during sleep.   A witness to sleep is present.   The patient awakens with mouth dryness.      Orville Erwin  does not have symptoms of Restless Legs Syndrome. Nocturnal leg movements have not been noticed.   The patient does not experience sleep related leg cramps.   There is not a history of parasomnia.      Current Outpatient Medications:     acetaminophen (TYLENOL) 500 MG tablet, Take 2 tablets (1,000 mg total) by mouth every 6 (six) hours as needed for Pain., Disp: 30 tablet, Rfl: 0    albuterol (PROVENTIL/VENTOLIN HFA) 90 mcg/actuation inhaler, Inhale 2 puffs into the lungs every 6 (six) hours as needed for Wheezing or Shortness of Breath., Disp: 18 g, Rfl: 0    budesonide-formoterol 160-4.5 mcg (SYMBICORT) 160-4.5 mcg/actuation HFAA, Inhale 2 puffs into the lungs every 12 (twelve) hours. Controller, Disp: 1 Inhaler, Rfl: 0    diclofenac sodium (VOLTAREN) 1 % Gel, Apply 2 g topically 4 (four) times daily as needed (Apply to painful area up to 4 times a day as needed for pain). Apply to painful area 4 times a day as needed for pain, Disp: 1 Tube, Rfl: 0    fluticasone propionate (FLONASE) 50 mcg/actuation nasal spray, 2 sprays (100 mcg total) by Each Nostril route once daily., Disp: 16 g, Rfl: 0    fluticasone-salmeterol diskus inhaler 250-50 mcg, Inhale 1 puff into the lungs 2 (two) times daily., Disp: 60 each, Rfl: 5    ibuprofen (ADVIL,MOTRIN) 600 MG tablet, Take 1 tablet (600 mg total) by mouth every 6 (six) hours as needed for Pain (Take with food as needed for mild-to-moderate pain)., Disp: 20 tablet, Rfl: 0    lisinopriL (PRINIVIL,ZESTRIL) 20 MG tablet, TAKE 1 TABLET(20 MG) BY MOUTH EVERY DAY, Disp: 30 tablet, Rfl: 0    loratadine (CLARITIN) 10 mg tablet, Take 1 tablet (10 mg total) by mouth every morning., Disp: 60 tablet, Rfl: 0    methocarbamoL (ROBAXIN) 750 MG Tab, Take 1 tablet (750 mg total) by mouth 3 (three) times daily as needed (As needed for muscle spasm). As needed for muscle spam., Disp: 30 tablet, Rfl: 0    montelukast (SINGULAIR) 10 mg tablet, Take 1 tablet (10 mg total) by mouth every  evening., Disp: 30 tablet, Rfl: 0     Review of patient's allergies indicates:  No Known Allergies      Past Medical History:   Diagnosis Date    Hypertension     Obesity     Sleep apnea        Past Surgical History:   Procedure Laterality Date    hypoxemia  2016    hospitalized for 4day    TONSILLECTOMY      as child       Family History   Problem Relation Age of Onset    Seizures Mother     Hypertension Father     Diabetes Paternal Grandmother     Constipation Daughter     Alzheimer's disease Maternal Grandmother     Prostate cancer Maternal Grandfather        Social History     Socioeconomic History    Marital status: Single   Occupational History    Occupation:     Occupation:    Tobacco Use    Smoking status: Current Some Day Smoker     Packs/day: 0.50     Years: 10.00     Pack years: 5.00     Types: Cigarettes, Cigars    Smokeless tobacco: Former User   Substance and Sexual Activity    Alcohol use: No     Comment: socially    Drug use: No    Sexual activity: Yes     Partners: Female   Social History Narrative    Lives w/wife and 1 child (daughter 3 yrs old)           Old medical records.    There were no vitals filed for this visit.           The patient was given open opportunity to ask questions and/or express concerns about treatment plan.   All questions/concerns were discussed.   Driving precautions were provided.     Two patient identifiers used prior to evaluation.    Thank you for referring Orville Erwin for evaluation.             Past Medical History:   Diagnosis Date    Hypertension     Obesity     Sleep apnea      Past Surgical History:   Procedure Laterality Date    hypoxemia  2016    hospitalized for 4day    TONSILLECTOMY      as child     Family History   Problem Relation Age of Onset    Seizures Mother     Hypertension Father     Diabetes Paternal Grandmother     Constipation Daughter     Alzheimer's disease Maternal Grandmother      Prostate cancer Maternal Grandfather      Social History     Socioeconomic History    Marital status: Single   Occupational History    Occupation:     Occupation:    Tobacco Use    Smoking status: Current Some Day Smoker     Packs/day: 0.50     Years: 10.00     Pack years: 5.00     Types: Cigarettes, Cigars    Smokeless tobacco: Former User   Substance and Sexual Activity    Alcohol use: No     Comment: socially    Drug use: No    Sexual activity: Yes     Partners: Female   Social History Narrative    Lives w/wife and 1 child (daughter 3 yrs old)       Current Outpatient Medications   Medication Sig Dispense Refill    acetaminophen (TYLENOL) 500 MG tablet Take 2 tablets (1,000 mg total) by mouth every 6 (six) hours as needed for Pain. 30 tablet 0    albuterol (PROVENTIL/VENTOLIN HFA) 90 mcg/actuation inhaler Inhale 2 puffs into the lungs every 6 (six) hours as needed for Wheezing or Shortness of Breath. 18 g 0    budesonide-formoterol 160-4.5 mcg (SYMBICORT) 160-4.5 mcg/actuation HFAA Inhale 2 puffs into the lungs every 12 (twelve) hours. Controller 1 Inhaler 0    diclofenac sodium (VOLTAREN) 1 % Gel Apply 2 g topically 4 (four) times daily as needed (Apply to painful area up to 4 times a day as needed for pain). Apply to painful area 4 times a day as needed for pain 1 Tube 0    fluticasone propionate (FLONASE) 50 mcg/actuation nasal spray 2 sprays (100 mcg total) by Each Nostril route once daily. 16 g 0    fluticasone-salmeterol diskus inhaler 250-50 mcg Inhale 1 puff into the lungs 2 (two) times daily. 60 each 5    ibuprofen (ADVIL,MOTRIN) 600 MG tablet Take 1 tablet (600 mg total) by mouth every 6 (six) hours as needed for Pain (Take with food as needed for mild-to-moderate pain). 20 tablet 0    lisinopriL (PRINIVIL,ZESTRIL) 20 MG tablet TAKE 1 TABLET(20 MG) BY MOUTH EVERY DAY 30 tablet 0    loratadine (CLARITIN) 10 mg tablet Take 1 tablet (10 mg total) by mouth every  morning. 60 tablet 0    methocarbamoL (ROBAXIN) 750 MG Tab Take 1 tablet (750 mg total) by mouth 3 (three) times daily as needed (As needed for muscle spasm). As needed for muscle spam. 30 tablet 0    montelukast (SINGULAIR) 10 mg tablet Take 1 tablet (10 mg total) by mouth every evening. 30 tablet 0     No current facility-administered medications for this visit.     Review of patient's allergies indicates:  No Known Allergies    Review of Systems    Objective:      There were no vitals filed for this visit.  Physical Exam    Lab Review:   CBC:   Lab Results   Component Value Date    WBC 11.60 02/01/2018    RBC 5.23 02/01/2018    HGB 14.7 02/01/2018    HCT 44.8 02/01/2018     02/01/2018     BMP:   Lab Results   Component Value Date     (H) 02/01/2018     02/01/2018    K 4.0 02/01/2018     02/01/2018    CO2 24 02/01/2018    BUN 7 02/01/2018    CREATININE 0.8 02/01/2018    CALCIUM 9.1 02/01/2018     Diagnostics Review: Echo: Reviewed     Assessment:       1. CHARLY (obstructive sleep apnea)    2. BMI 50.0-59.9, adult    3. Hypertension, essential    4. Nocturnal hypoxemia        Plan:          The pathophysiology of CHARLY was discussed.   The effects of CHARLY on patient's co-morbid conditions and the increased morbidity and/or mortality associated with this condition were reviewed.   The patient was given open opportunity to ask questions and/or express concerns about treatment plan.   All questions/concerns were discussed.   Driving precautions were provided.       Thank you for referring Orville Ramírez Yaima for evaluation.       Patient is compliant and benefits from use.   Machine is broken and beyond repair.   Replacement machine ordered.   Compliance follow up    The patient location is: home  The chief complaint leading to consultation is: CHARLY    Visit type: audiovisual    Face to Face time with patient: 14  32 minutes of total time spent on the encounter, which includes face to  face time and non-face to face time preparing to see the patient (eg, review of tests), Obtaining and/or reviewing separately obtained history, Documenting clinical information in the electronic or other health record, Independently interpreting results (not separately reported) and communicating results to the patient/family/caregiver, or Care coordination (not separately reported).         Each patient to whom he or she provides medical services by telemedicine is:  (1) informed of the relationship between the physician and patient and the respective role of any other health care provider with respect to management of the patient; and (2) notified that he or she may decline to receive medical services by telemedicine and may withdraw from such care at any time.    Notes:       DATE ? 2/2001 4/2011         AHI ?  107         Sa02 ANH            CPAP/BIPAP  11 16         AHI on PAP  ? 1.9         CURRENT SETTING    15        PLMDI  0 0         PLMDAI  0 0         MSLT            MISC   O2 added         DME

## 2022-05-25 ENCOUNTER — TELEPHONE (OUTPATIENT)
Dept: SLEEP MEDICINE | Facility: CLINIC | Age: 32
End: 2022-05-25
Payer: MEDICAID

## 2022-05-25 ENCOUNTER — OFFICE VISIT (OUTPATIENT)
Dept: URGENT CARE | Facility: CLINIC | Age: 32
End: 2022-05-25
Payer: MEDICAID

## 2022-05-25 VITALS
DIASTOLIC BLOOD PRESSURE: 74 MMHG | WEIGHT: 315 LBS | SYSTOLIC BLOOD PRESSURE: 152 MMHG | HEART RATE: 112 BPM | OXYGEN SATURATION: 97 % | RESPIRATION RATE: 20 BRPM | HEIGHT: 73 IN | TEMPERATURE: 98 F | BODY MASS INDEX: 41.75 KG/M2

## 2022-05-25 DIAGNOSIS — J40 BRONCHITIS: Primary | ICD-10-CM

## 2022-05-25 DIAGNOSIS — R09.81 NASAL CONGESTION: ICD-10-CM

## 2022-05-25 DIAGNOSIS — Z76.89 ENCOUNTER TO ESTABLISH CARE: ICD-10-CM

## 2022-05-25 PROCEDURE — 71046 X-RAY EXAM CHEST 2 VIEWS: CPT | Mod: S$GLB,,, | Performed by: RADIOLOGY

## 2022-05-25 PROCEDURE — 3078F DIAST BP <80 MM HG: CPT | Mod: CPTII,S$GLB,,

## 2022-05-25 PROCEDURE — 1160F PR REVIEW ALL MEDS BY PRESCRIBER/CLIN PHARMACIST DOCUMENTED: ICD-10-PCS | Mod: CPTII,S$GLB,,

## 2022-05-25 PROCEDURE — 3078F PR MOST RECENT DIASTOLIC BLOOD PRESSURE < 80 MM HG: ICD-10-PCS | Mod: CPTII,S$GLB,,

## 2022-05-25 PROCEDURE — 3077F PR MOST RECENT SYSTOLIC BLOOD PRESSURE >= 140 MM HG: ICD-10-PCS | Mod: CPTII,S$GLB,,

## 2022-05-25 PROCEDURE — 3077F SYST BP >= 140 MM HG: CPT | Mod: CPTII,S$GLB,,

## 2022-05-25 PROCEDURE — 71046 XR CHEST PA AND LATERAL: ICD-10-PCS | Mod: S$GLB,,, | Performed by: RADIOLOGY

## 2022-05-25 PROCEDURE — 1159F MED LIST DOCD IN RCRD: CPT | Mod: CPTII,S$GLB,,

## 2022-05-25 PROCEDURE — 99203 OFFICE O/P NEW LOW 30 MIN: CPT | Mod: S$GLB,,,

## 2022-05-25 PROCEDURE — 3008F PR BODY MASS INDEX (BMI) DOCUMENTED: ICD-10-PCS | Mod: CPTII,S$GLB,,

## 2022-05-25 PROCEDURE — 1160F RVW MEDS BY RX/DR IN RCRD: CPT | Mod: CPTII,S$GLB,,

## 2022-05-25 PROCEDURE — 1159F PR MEDICATION LIST DOCUMENTED IN MEDICAL RECORD: ICD-10-PCS | Mod: CPTII,S$GLB,,

## 2022-05-25 PROCEDURE — 3008F BODY MASS INDEX DOCD: CPT | Mod: CPTII,S$GLB,,

## 2022-05-25 PROCEDURE — 99203 PR OFFICE/OUTPT VISIT, NEW, LEVL III, 30-44 MIN: ICD-10-PCS | Mod: S$GLB,,,

## 2022-05-25 RX ORDER — LORATADINE 10 MG/1
10 TABLET ORAL DAILY
Qty: 30 TABLET | Refills: 0 | Status: SHIPPED | OUTPATIENT
Start: 2022-05-25 | End: 2023-03-02

## 2022-05-25 RX ORDER — MONTELUKAST SODIUM 10 MG/1
10 TABLET ORAL NIGHTLY
Qty: 30 TABLET | Refills: 0 | Status: SHIPPED | OUTPATIENT
Start: 2022-05-25 | End: 2022-06-24

## 2022-05-25 RX ORDER — ALBUTEROL SULFATE 90 UG/1
2 AEROSOL, METERED RESPIRATORY (INHALATION) EVERY 6 HOURS PRN
Qty: 18 G | Refills: 0 | Status: SHIPPED | OUTPATIENT
Start: 2022-05-25

## 2022-05-25 RX ORDER — BENZONATATE 200 MG/1
200 CAPSULE ORAL 3 TIMES DAILY PRN
Qty: 30 CAPSULE | Refills: 0 | Status: SHIPPED | OUTPATIENT
Start: 2022-05-25 | End: 2022-06-04

## 2022-05-25 RX ORDER — FLUTICASONE PROPIONATE 50 MCG
1 SPRAY, SUSPENSION (ML) NASAL DAILY
Qty: 15.8 ML | Refills: 0 | Status: SHIPPED | OUTPATIENT
Start: 2022-05-25

## 2022-05-25 RX ORDER — PROMETHAZINE HYDROCHLORIDE AND DEXTROMETHORPHAN HYDROBROMIDE 6.25; 15 MG/5ML; MG/5ML
5 SYRUP ORAL EVERY 4 HOURS PRN
Qty: 180 ML | Refills: 0 | Status: SHIPPED | OUTPATIENT
Start: 2022-05-25 | End: 2022-06-04

## 2022-05-25 NOTE — TELEPHONE ENCOUNTER
Spoke to the pt and informed him that his CPAP machine was order but it is going to take anywhere between 4-15 weeks before he will hear from the [facility]

## 2022-05-25 NOTE — PROGRESS NOTES
"Subjective:       Patient ID: Orville Erwin is a 31 y.o. male.    Vitals:  height is 6' 1" (1.854 m) and weight is 182.1 kg (401 lb 7.3 oz) (abnormal). His temperature is 98.3 °F (36.8 °C). His blood pressure is 152/74 (abnormal) and his pulse is 112 (abnormal). His respiration is 20 and oxygen saturation is 97%.     Chief Complaint: Nasal Congestion (Coughing and green mucus. - Entered by patient)    Patient is a 31-year-old male with a history of hypertension, asthma, sleep apnea presents with productive coughing for the past 2 weeks.  He was positive for the flu 2 weeks ago.  He was seen in the ED for this.  He was given prescriptions for his symptoms but he has not been able to pick them up yet.  Coughing has been persistent since then.  Also states that he had nasal congestion, runny nose, intermittent ear pressure starting this morning.  Denies any fever, chills, body aches, headaches, sore throat, chest pain, shortness of breath, wheezing, nausea, vomiting, abdominal pain.    Cough  This is a recurrent problem. The current episode started 1 to 4 weeks ago. The problem has been unchanged. The problem occurs constantly. The cough is productive of purulent sputum. Associated symptoms include ear pain (Pressure), nasal congestion and postnasal drip. Pertinent negatives include no chest pain, chills, ear congestion, fever, headaches, heartburn, hemoptysis, myalgias, rash, rhinorrhea, sore throat, shortness of breath, sweats, weight loss or wheezing. The symptoms are aggravated by lying down. Risk factors for lung disease include smoking/tobacco exposure. He has tried nothing for the symptoms. His past medical history is significant for asthma and bronchitis. There is no history of bronchiectasis, COPD, emphysema, environmental allergies or pneumonia. Sleep Apnea       Constitution: Negative for chills and fever.   HENT: Positive for ear pain (Pressure), congestion and postnasal drip. Negative for " ear discharge and sore throat.    Neck: Negative for neck pain and neck stiffness.   Cardiovascular: Negative for chest pain.   Respiratory: Positive for cough. Negative for chest tightness, bloody sputum, shortness of breath and wheezing.    Gastrointestinal: Negative for abdominal pain, nausea, vomiting, constipation, diarrhea and heartburn.   Musculoskeletal: Negative for muscle ache.   Skin: Negative for rash.   Allergic/Immunologic: Negative for environmental allergies and sneezing.   Neurological: Negative for dizziness, light-headedness and headaches.       Objective:      Physical Exam   Constitutional: He is oriented to person, place, and time.  Non-toxic appearance. He does not appear ill. No distress. obesity  HENT:   Head: Normocephalic and atraumatic.   Ears:   Right Ear: Tympanic membrane, external ear and ear canal normal.   Left Ear: Tympanic membrane, external ear and ear canal normal.   Nose: Rhinorrhea and congestion present.   Mouth/Throat: Mucous membranes are moist. No oropharyngeal exudate or posterior oropharyngeal erythema. Oropharynx is clear.   Eyes: Conjunctivae are normal. Right eye exhibits no discharge. Left eye exhibits no discharge.   Neck: No neck rigidity present.   Cardiovascular: Normal rate, regular rhythm, normal heart sounds and normal pulses.   Pulmonary/Chest: Effort normal and breath sounds normal. No respiratory distress. He has no wheezes. He has no rhonchi. He has no rales.   Abdominal: Normal appearance.   Musculoskeletal: Normal range of motion.         General: Normal range of motion.      Cervical back: He exhibits no tenderness.   Neurological: no focal deficit. He is alert and oriented to person, place, and time.   Skin: Skin is warm, dry and not diaphoretic. Capillary refill takes less than 2 seconds.   Nursing note and vitals reviewed.    XR CHEST PA AND LATERAL    Result Date: 5/25/2022  EXAMINATION: XR CHEST PA AND LATERAL CLINICAL HISTORY: Cough, unspecified  FINDINGS: Chest two views: Heart size is normal.  Lungs are clear.  The bones showed DJD.     No acute process seen. Electronically signed by: Marko Reid MD Date:    05/25/2022 Time:    15:11        Assessment:       1. Bronchitis    2. Nasal congestion    3. Encounter to establish care          Plan:         Bronchitis  -     XR CHEST PA AND LATERAL; Future; Expected date: 05/25/2022  -     benzonatate (TESSALON) 200 MG capsule; Take 1 capsule (200 mg total) by mouth 3 (three) times daily as needed for Cough.  Dispense: 30 capsule; Refill: 0  -     promethazine-dextromethorphan (PROMETHAZINE-DM) 6.25-15 mg/5 mL Syrp; Take 5 mLs by mouth every 4 (four) hours as needed (cough).  Dispense: 180 mL; Refill: 0  -     montelukast (SINGULAIR) 10 mg tablet; Take 1 tablet (10 mg total) by mouth every evening.  Dispense: 30 tablet; Refill: 0    Nasal congestion  -     albuterol (PROVENTIL/VENTOLIN HFA) 90 mcg/actuation inhaler; Inhale 2 puffs into the lungs every 6 (six) hours as needed for Wheezing or Shortness of Breath. Rescue  Dispense: 18 g; Refill: 0  -     fluticasone propionate (FLONASE) 50 mcg/actuation nasal spray; 1 spray (50 mcg total) by Each Nostril route once daily.  Dispense: 15.8 mL; Refill: 0  -     loratadine (CLARITIN) 10 mg tablet; Take 1 tablet (10 mg total) by mouth once daily.  Dispense: 30 tablet; Refill: 0    Encounter to establish care  -     Ambulatory referral/consult to Internal Medicine                 Patient Instructions   Bronchitis  If your condition worsens or fails to improve we recommend that you receive another evaluation at the ER immediately or contact your PCP to discuss your concerns or return here. You must understand that you've received an urgent care treatment only and that you may be released before all your medical problems are known or treated. You the patient will arrange for follouwp care as instructed. .  Rest and fluids are important  Can use honey with brooke to  "soothe your throat  Take inhaler as prescribed and needed for wheezing  Take prescription cough meds (pills) as prescribed; take prescription cough syrup at night as needed for cough.  Do not take both the prescribed cough pills and syrup at the same time.   -  Flonase (fluticasone) is a nasal spray which is available over the counter and may help with your symptoms.   -  Zyrtec D, Claritin D or Allegra D can also help with symptoms of congestion and drainage.   -  If you have hypertension avoid using the "D" which is the decongestant.  Instead you can use Coricidin HBP for cold and cough symptoms.    -  If you just have drainage you can take plain Zyrtec, Claritin or Allegra   -  If you just have a congested feeling you can take pseudoephedrine (unless you have high blood pressure) which you have to sign for behind the counter. Do not buy the phenylephrine which is on the shelf as it is not effective   -  Rest and fluids are also important.   -  Tylenol or ibuprofen can also be used as directed for pain unless you have an allergy to them or medical condition such as stomach ulcers, kidney or liver disease or blood thinners etc for which you should not be taking these type of medications.   Please follow up with your primary care doctor or specialist in the next 48-72hrs as needed and if no improvement  If you  smoke, please stop smoking.                                                                    "

## 2022-05-25 NOTE — PATIENT INSTRUCTIONS
"Bronchitis  If your condition worsens or fails to improve we recommend that you receive another evaluation at the ER immediately or contact your PCP to discuss your concerns or return here. You must understand that you've received an urgent care treatment only and that you may be released before all your medical problems are known or treated. You the patient will arrange for follouwp care as instructed. .  Rest and fluids are important  Can use honey with brooke to soothe your throat  Take inhaler as prescribed and needed for wheezing  Take prescription cough meds (pills) as prescribed; take prescription cough syrup at night as needed for cough.  Do not take both the prescribed cough pills and syrup at the same time.   -  Flonase (fluticasone) is a nasal spray which is available over the counter and may help with your symptoms.   -  Zyrtec D, Claritin D or Allegra D can also help with symptoms of congestion and drainage.   -  If you have hypertension avoid using the "D" which is the decongestant.  Instead you can use Coricidin HBP for cold and cough symptoms.    -  If you just have drainage you can take plain Zyrtec, Claritin or Allegra   -  If you just have a congested feeling you can take pseudoephedrine (unless you have high blood pressure) which you have to sign for behind the counter. Do not buy the phenylephrine which is on the shelf as it is not effective   -  Rest and fluids are also important.   -  Tylenol or ibuprofen can also be used as directed for pain unless you have an allergy to them or medical condition such as stomach ulcers, kidney or liver disease or blood thinners etc for which you should not be taking these type of medications.   Please follow up with your primary care doctor or specialist in the next 48-72hrs as needed and if no improvement  If you  smoke, please stop smoking.                                                                "

## 2022-06-09 ENCOUNTER — HOSPITAL ENCOUNTER (EMERGENCY)
Facility: HOSPITAL | Age: 32
Discharge: HOME | End: 2022-06-09
Attending: EMERGENCY MEDICINE
Payer: MEDICAID

## 2022-06-09 VITALS
HEIGHT: 73 IN | RESPIRATION RATE: 12 BRPM | OXYGEN SATURATION: 96 % | WEIGHT: 315 LBS | HEART RATE: 73 BPM | TEMPERATURE: 99 F | SYSTOLIC BLOOD PRESSURE: 133 MMHG | BODY MASS INDEX: 41.75 KG/M2 | DIASTOLIC BLOOD PRESSURE: 60 MMHG

## 2022-06-09 DIAGNOSIS — R00.2 PALPITATIONS: Primary | ICD-10-CM

## 2022-06-09 DIAGNOSIS — R07.9 CHEST PAIN: ICD-10-CM

## 2022-06-09 LAB
ALBUMIN SERPL BCP-MCNC: 3.7 G/DL (ref 3.5–5.2)
ALP SERPL-CCNC: 52 U/L (ref 55–135)
ALT SERPL W/O P-5'-P-CCNC: 55 U/L (ref 10–44)
ANION GAP SERPL CALC-SCNC: 9 MMOL/L (ref 8–16)
AST SERPL-CCNC: 28 U/L (ref 10–40)
BASOPHILS # BLD AUTO: 0.03 K/UL (ref 0–0.2)
BASOPHILS NFR BLD: 0.3 % (ref 0–1.9)
BILIRUB SERPL-MCNC: 0.7 MG/DL (ref 0.1–1)
BNP SERPL-MCNC: <10 PG/ML (ref 0–99)
BUN SERPL-MCNC: 13 MG/DL (ref 6–20)
CALCIUM SERPL-MCNC: 9.2 MG/DL (ref 8.7–10.5)
CHLORIDE SERPL-SCNC: 107 MMOL/L (ref 95–110)
CO2 SERPL-SCNC: 23 MMOL/L (ref 23–29)
CREAT SERPL-MCNC: 0.8 MG/DL (ref 0.5–1.4)
D DIMER PPP IA.FEU-MCNC: 0.19 MG/L FEU
DIFFERENTIAL METHOD: ABNORMAL
EOSINOPHIL # BLD AUTO: 0.1 K/UL (ref 0–0.5)
EOSINOPHIL NFR BLD: 1.1 % (ref 0–8)
ERYTHROCYTE [DISTWIDTH] IN BLOOD BY AUTOMATED COUNT: 12.6 % (ref 11.5–14.5)
EST. GFR  (AFRICAN AMERICAN): >60 ML/MIN/1.73 M^2
EST. GFR  (NON AFRICAN AMERICAN): >60 ML/MIN/1.73 M^2
GLUCOSE SERPL-MCNC: 135 MG/DL (ref 70–110)
HCT VFR BLD AUTO: 42.5 % (ref 40–54)
HGB BLD-MCNC: 14.1 G/DL (ref 14–18)
IMM GRANULOCYTES # BLD AUTO: 0.04 K/UL (ref 0–0.04)
IMM GRANULOCYTES NFR BLD AUTO: 0.4 % (ref 0–0.5)
LYMPHOCYTES # BLD AUTO: 1.9 K/UL (ref 1–4.8)
LYMPHOCYTES NFR BLD: 18.5 % (ref 18–48)
MCH RBC QN AUTO: 28.3 PG (ref 27–31)
MCHC RBC AUTO-ENTMCNC: 33.2 G/DL (ref 32–36)
MCV RBC AUTO: 85 FL (ref 82–98)
MONOCYTES # BLD AUTO: 0.5 K/UL (ref 0.3–1)
MONOCYTES NFR BLD: 4.9 % (ref 4–15)
NEUTROPHILS # BLD AUTO: 7.8 K/UL (ref 1.8–7.7)
NEUTROPHILS NFR BLD: 74.8 % (ref 38–73)
NRBC BLD-RTO: 0 /100 WBC
PLATELET # BLD AUTO: 170 K/UL (ref 150–450)
PMV BLD AUTO: 10.8 FL (ref 9.2–12.9)
POTASSIUM SERPL-SCNC: 3.7 MMOL/L (ref 3.5–5.1)
PROT SERPL-MCNC: 7.6 G/DL (ref 6–8.4)
RBC # BLD AUTO: 4.98 M/UL (ref 4.6–6.2)
SODIUM SERPL-SCNC: 139 MMOL/L (ref 136–145)
TROPONIN I SERPL DL<=0.01 NG/ML-MCNC: 0.01 NG/ML (ref 0–0.03)
TSH SERPL DL<=0.005 MIU/L-ACNC: 1.05 UIU/ML (ref 0.4–4)
WBC # BLD AUTO: 10.46 K/UL (ref 3.9–12.7)

## 2022-06-09 PROCEDURE — 99285 EMERGENCY DEPT VISIT HI MDM: CPT | Mod: 25

## 2022-06-09 PROCEDURE — 93005 ELECTROCARDIOGRAM TRACING: CPT

## 2022-06-09 PROCEDURE — 25000003 PHARM REV CODE 250: Performed by: EMERGENCY MEDICINE

## 2022-06-09 PROCEDURE — 93010 ELECTROCARDIOGRAM REPORT: CPT | Mod: ,,, | Performed by: INTERNAL MEDICINE

## 2022-06-09 PROCEDURE — 84484 ASSAY OF TROPONIN QUANT: CPT | Performed by: EMERGENCY MEDICINE

## 2022-06-09 PROCEDURE — 80053 COMPREHEN METABOLIC PANEL: CPT | Performed by: EMERGENCY MEDICINE

## 2022-06-09 PROCEDURE — 83880 ASSAY OF NATRIURETIC PEPTIDE: CPT | Performed by: EMERGENCY MEDICINE

## 2022-06-09 PROCEDURE — 84443 ASSAY THYROID STIM HORMONE: CPT | Performed by: EMERGENCY MEDICINE

## 2022-06-09 PROCEDURE — 93010 EKG 12-LEAD: ICD-10-PCS | Mod: ,,, | Performed by: INTERNAL MEDICINE

## 2022-06-09 PROCEDURE — 85025 COMPLETE CBC W/AUTO DIFF WBC: CPT | Performed by: EMERGENCY MEDICINE

## 2022-06-09 PROCEDURE — 85379 FIBRIN DEGRADATION QUANT: CPT | Performed by: EMERGENCY MEDICINE

## 2022-06-09 RX ORDER — DIAZEPAM 5 MG/1
5 TABLET ORAL
Status: COMPLETED | OUTPATIENT
Start: 2022-06-09 | End: 2022-06-09

## 2022-06-09 RX ORDER — HYDROXYZINE PAMOATE 25 MG/1
25 CAPSULE ORAL EVERY 8 HOURS PRN
Qty: 30 CAPSULE | Refills: 0 | Status: SHIPPED | OUTPATIENT
Start: 2022-06-09

## 2022-06-09 RX ADMIN — DIAZEPAM 5 MG: 5 TABLET ORAL at 02:06

## 2022-06-09 NOTE — ED TRIAGE NOTES
Pt. Complains of a racing heart and shortness of breath that started about 1 hour ago. Pt. States that his symptoms are worse after he eats. Pt. Also complains of epigastric discomfort. Pt. Rates his pain at a 10/10 on the pain scale. Pt. Is very anxious.

## 2022-06-09 NOTE — ED PROVIDER NOTES
Encounter Date: 6/9/2022       History     Chief Complaint   Patient presents with    Shortness of Breath     The patient reports sob and palpitations that started about 2 weeks ago and is progressively getting worse. Patient denies chest pain. Reports that he has been out of his blood pressure medication for 2 years.    Palpitations     31-year-old male with history of hypertension and sleep apnea presents to the emergency department for evaluation of palpitations and shortness of breath for the past 2 weeks.  Patient reports that when he is walking up stairs prolonged distances he is little palpitations and feels short of breath and began to feel very anxious.  He reports associated epigastric discomfort.  Denies diaphoresis, nausea, vomiting abdominal pain, diarrhea, dysuria.  Denies recent travel or prolonged immobilization.  Patient initially reports palpitations and discomfort when eating.  Patient reports that he has noticed that his blood pressure has been running high.  He has not taken blood pressure medication for the past 2 years.        Review of patient's allergies indicates:  No Known Allergies  Past Medical History:   Diagnosis Date    Hypertension     Obesity     Sleep apnea      Past Surgical History:   Procedure Laterality Date    hypoxemia 2016    hospitalized for 4day    TONSILLECTOMY      as child     Family History   Problem Relation Age of Onset    Seizures Mother     Hypertension Father     Diabetes Paternal Grandmother     Constipation Daughter     Alzheimer's disease Maternal Grandmother     Prostate cancer Maternal Grandfather      Social History     Tobacco Use    Smoking status: Current Some Day Smoker     Packs/day: 0.50     Years: 10.00     Pack years: 5.00     Types: Cigarettes, Cigars     Start date: 5/25/2013    Smokeless tobacco: Former User   Substance Use Topics    Alcohol use: No     Comment: socially    Drug use: No     Review of Systems   Constitutional:  Negative for diaphoresis and fever.   HENT: Negative for sore throat.    Eyes: Negative for pain.   Respiratory: Positive for shortness of breath.    Cardiovascular: Positive for chest pain and palpitations.   Gastrointestinal: Negative for abdominal pain, diarrhea, nausea and vomiting.   Genitourinary: Negative for dysuria.   Musculoskeletal: Negative for back pain.   Skin: Negative for rash.   Neurological: Negative for weakness, numbness and headaches.   Psychiatric/Behavioral: Negative for dysphoric mood. The patient is nervous/anxious.        Physical Exam     Initial Vitals [06/09/22 1259]   BP Pulse Resp Temp SpO2   (!) 187/97 103 20 98.5 °F (36.9 °C) 98 %      MAP       --         Physical Exam    Nursing note and vitals reviewed.  Constitutional: He is not diaphoretic. No distress.   HENT:   Head: Normocephalic and atraumatic.   Mouth/Throat: Oropharynx is clear and moist.   Eyes: Conjunctivae and EOM are normal. No scleral icterus.   Neck: Neck supple. No tracheal deviation present.   Normal range of motion.  Cardiovascular: Normal rate, regular rhythm and intact distal pulses.   Pulmonary/Chest: Breath sounds normal. No stridor. No respiratory distress.   Abdominal: Abdomen is soft. He exhibits no distension. There is no abdominal tenderness.   Musculoskeletal:         General: No tenderness. Normal range of motion.      Cervical back: Normal range of motion and neck supple.      Comments: Symmetrical lower extremities     Neurological: He is alert. He has normal strength. No cranial nerve deficit or sensory deficit. GCS score is 15. GCS eye subscore is 4. GCS verbal subscore is 5. GCS motor subscore is 6.   Skin: Skin is warm and dry.   Psychiatric: He has a normal mood and affect.         ED Course   Procedures  Labs Reviewed   CBC W/ AUTO DIFFERENTIAL - Abnormal; Notable for the following components:       Result Value    Gran # (ANC) 7.8 (*)     Gran % 74.8 (*)     All other components within normal  limits   COMPREHENSIVE METABOLIC PANEL - Abnormal; Notable for the following components:    Glucose 135 (*)     Alkaline Phosphatase 52 (*)     ALT 55 (*)     All other components within normal limits   TROPONIN I   B-TYPE NATRIURETIC PEPTIDE   D DIMER, QUANTITATIVE   TSH     EKG Readings: (Independently Interpreted)   Initial Reading: No STEMI. Rhythm: Sinus Tachycardia. Heart Rate: 105. Conduction: Normal. ST Segments: Normal ST Segments. T Waves Flipped: III and AVF. Axis: Right Axis Deviation.       Imaging Results          X-Ray Chest AP Portable (Final result)  Result time 06/09/22 13:50:44    Final result by Timothy Rich MD (06/09/22 13:50:44)                 Impression:      No active cardiopulmonary disease and no significant interval change      Electronically signed by: Timothy Rich MD  Date:    06/09/2022  Time:    13:50             Narrative:    EXAMINATION:  XR CHEST AP PORTABLE    CLINICAL HISTORY:  Chest Pain;    TECHNIQUE:  Single frontal view of the chest was performed.    COMPARISON:  05/25/2022    FINDINGS:  Cardiac silhouette is magnified by portable AP technique.  The heart appears normal in size and unchanged.  Pulmonary vascular pattern does not appear congested.  Lungs are satisfactorily expanded and appear free of active disease.  No pleural fluid or pneumothorax.  Skeletal structures appear intact.                                 Medications   diazePAM tablet 5 mg (5 mg Oral Given 6/9/22 1425)     Medical Decision Making:   History:   Old Medical Records: I decided to obtain old medical records.  Differential Diagnosis:   Includes but not limited to:  ACS, anxiety, CHF, PE, pneumonia, electrolyte abnormality  Independently Interpreted Test(s):   I have ordered and independently interpreted EKG Reading(s) - see prior notes  Clinical Tests:   Lab Tests: Ordered and Reviewed  Radiological Study: Ordered  Medical Tests: Ordered and Reviewed  ED Management:  Patient hypertensive at triage but  blood pressure has improved somewhat to time history and physical.  EKG is without definite acute ischemic changes.  Labs of leukocytosis or significant electrolyte abnormality.  Troponin, BNP, D-dimer within normal ranges.  TSH within normal ranges.  Patient has a heart score of 3.  He is medically stable for discharge with follow-up with cardiology as well as primary physician.  Patient given Valium in the emergency department reports improvement in symptoms.  Symptoms may be related to anxiety.  Patient prescribe short course of Atarax. counseled on supportive care, appropriate medication usage, concerning symptoms for which to return to ER and the importance of follow up. Understanding and agreement with treatment plan was expressed.   This chart was completed using dictation software, as a result there may be some transcription errors.     Additional MDM:   Heart Score:    History:          Slightly suspicious.  ECG:             Nonspecific repolarisation disturbance  Age:               Less than 45 years  Risk factors: >= 3 risk factors or history of atherosclerotic disease  Troponin:       Less than or equal to normal limit  Final Score: 3                       Clinical Impression:   Final diagnoses:  [R07.9] Chest pain  [R00.2] Palpitations (Primary)                 Shawn Silva MD  06/09/22 5955

## 2022-06-10 ENCOUNTER — TELEPHONE (OUTPATIENT)
Dept: CARDIOLOGY | Facility: CLINIC | Age: 32
End: 2022-06-10
Payer: MEDICAID

## 2022-06-10 NOTE — TELEPHONE ENCOUNTER
----- Message from Laura Garcia sent at 6/10/2022 10:09 AM CDT -----  Contact: [phone] or [phone]/ Self  Type:  Sooner Appointment Request     Caller is requesting a sooner appointment.  Caller declined first available appointment listed below.  Caller will not accept being placed on the waitlist and is requesting a message be sent to doctor.  Name of Caller: pt  When is the first available appointment? 09/29/2022  Symptoms or Reason: Chest pain / ED FU   Would the patient rather a call back or a response via [facility]? Call back  Best Call Back Number: [phone] or [phone]  Additional Information: Pt was told to f/u within one week

## 2022-06-23 ENCOUNTER — TELEPHONE (OUTPATIENT)
Dept: PULMONOLOGY | Facility: CLINIC | Age: 32
End: 2022-06-23
Payer: MEDICAID

## 2022-06-23 DIAGNOSIS — J45.909 ASTHMA, UNSPECIFIED ASTHMA SEVERITY, UNSPECIFIED WHETHER COMPLICATED, UNSPECIFIED WHETHER PERSISTENT: Primary | ICD-10-CM

## 2022-08-13 NOTE — DISCHARGE INSTRUCTIONS
Lab tests are within acceptable ranges.  Avoid caffeine, stimulants.  Drink adequate fluids to remain hydrated.  Use Atarax as needed for anxiety symptoms.  Do not drive or operate heavy machinery while using Atarax as it can cause drowsiness and decreased coordination.  It is very important that she schedule close follow-up with a primary care physician for general health maintenance and some mountain further evaluate your symptoms.  You should also schedule cardiology follow-up to monitor symptoms to return to the emergency department for any new, worsening or significantly concerning symptoms.    Thank you for coming to our Emergency Department today. It is important to remember that some problems are difficult to diagnose and may not be found during your first visit. Be sure to follow up with your primary care doctor and review any labs/imaging that was performed with them. If you do not have a primary care doctor, you may contact the one listed on your discharge paperwork or you may also call the [facility] Appointment Desk at [phone] to schedule an appointment with one.     All medications may potentially have side effects and it is impossible to predict which medications may give you side effects. If you feel that you are having a negative effect of any medication you should immediately stop taking them and seek medical attention.    Return to the ER with any questions/concerns, new/concerning symptoms, worsening or failure to improve. Do not drive or make any important decisions for 24 hours if you have received any pain medications, sedatives or mood altering drugs during your ER visit.     PAST SURGICAL HISTORY:  Gastric bypass status for obesity , revised     H/O:  section x 2    History of cholecystectomy 1976    History of laminectomy lumbar, with fusion-     Plastic surgery for unacceptable cosmetic appearance arms from extra skin post weight loss after bypass surgery -

## 2022-09-23 ENCOUNTER — TELEPHONE (OUTPATIENT)
Dept: SLEEP MEDICINE | Facility: CLINIC | Age: 32
End: 2022-09-23
Payer: MEDICAID

## 2022-09-23 DIAGNOSIS — G47.33 OSA (OBSTRUCTIVE SLEEP APNEA): Primary | ICD-10-CM

## 2022-09-23 NOTE — TELEPHONE ENCOUNTER
----- Message from Jessika Golden sent at 9/23/2022 12:42 PM CDT -----  .Type: Patient Call Back    Who called: self     What is the request in detail: needs orders for face mask - full mface mask and strap. Please call     Can the clinic reply by [facility]? No     Would the patient rather a call back or a response via [facility]? Call     Best call back number: .[phone]

## 2022-09-23 NOTE — TELEPHONE ENCOUNTER
Spoke to the pt and his order is in. Also the pt is scheduled for his compliance f/u. Pt stated that he did not know or he did not remember that he needed to scheduled. Staff stated that is okay.

## 2022-12-02 ENCOUNTER — TELEPHONE (OUTPATIENT)
Dept: SLEEP MEDICINE | Facility: CLINIC | Age: 32
End: 2022-12-02
Payer: MEDICAID

## 2022-12-02 NOTE — TELEPHONE ENCOUNTER
Spoke to patient in regards to the message we received.He stated he needs chart notes sent over to his insurance company for him to keep his supplies. I spoke to the insurance company and they stated they needed more recent chart notes since 5/2022. I let the patient know and informed him that he did not show up to his appointment in November. I told him we do not have anything available until March. Patient is scheduled for March.

## 2022-12-02 NOTE — TELEPHONE ENCOUNTER
----- Message from Annel Thornton sent at 12/2/2022  2:27 PM CST -----  Regarding: sooner appt  Contact: ORVILLE MORALES [[id number]]  Name of Who is Calling: Orville Morales             What is the request in detail: Pt states he needs clinical notes faxed to his  insurance so they can continue to keep paying for CPAP supplies.  Please advise he is requesting a call back urgently in regards .                 Can the clinic reply by [facility]:   No         What Number to Call Back if not in [facility]: [phone]

## 2023-03-02 ENCOUNTER — OFFICE VISIT (OUTPATIENT)
Dept: SLEEP MEDICINE | Facility: CLINIC | Age: 33
End: 2023-03-02
Payer: MEDICAID

## 2023-03-02 VITALS
SYSTOLIC BLOOD PRESSURE: 154 MMHG | DIASTOLIC BLOOD PRESSURE: 85 MMHG | BODY MASS INDEX: 41.75 KG/M2 | WEIGHT: 315 LBS | HEIGHT: 73 IN | HEART RATE: 70 BPM

## 2023-03-02 DIAGNOSIS — G47.33 OSA (OBSTRUCTIVE SLEEP APNEA): Primary | ICD-10-CM

## 2023-03-02 DIAGNOSIS — Z99.89 INSUFFICIENT TREATMENT WITH NASAL CPAP: ICD-10-CM

## 2023-03-02 PROCEDURE — 3008F PR BODY MASS INDEX (BMI) DOCUMENTED: ICD-10-PCS | Mod: CPTII,,, | Performed by: INTERNAL MEDICINE

## 2023-03-02 PROCEDURE — 99213 OFFICE O/P EST LOW 20 MIN: CPT | Mod: PBBFAC | Performed by: INTERNAL MEDICINE

## 2023-03-02 PROCEDURE — 4010F PR ACE/ARB THEARPY RXD/TAKEN: ICD-10-PCS | Mod: CPTII,,, | Performed by: INTERNAL MEDICINE

## 2023-03-02 PROCEDURE — 99999 PR PBB SHADOW E&M-EST. PATIENT-LVL III: CPT | Mod: PBBFAC,,, | Performed by: INTERNAL MEDICINE

## 2023-03-02 PROCEDURE — 1159F MED LIST DOCD IN RCRD: CPT | Mod: CPTII,,, | Performed by: INTERNAL MEDICINE

## 2023-03-02 PROCEDURE — 3077F SYST BP >= 140 MM HG: CPT | Mod: CPTII,,, | Performed by: INTERNAL MEDICINE

## 2023-03-02 PROCEDURE — 99214 OFFICE O/P EST MOD 30 MIN: CPT | Mod: S$PBB,,, | Performed by: INTERNAL MEDICINE

## 2023-03-02 PROCEDURE — 3079F DIAST BP 80-89 MM HG: CPT | Mod: CPTII,,, | Performed by: INTERNAL MEDICINE

## 2023-03-02 PROCEDURE — 3079F PR MOST RECENT DIASTOLIC BLOOD PRESSURE 80-89 MM HG: ICD-10-PCS | Mod: CPTII,,, | Performed by: INTERNAL MEDICINE

## 2023-03-02 PROCEDURE — 4010F ACE/ARB THERAPY RXD/TAKEN: CPT | Mod: CPTII,,, | Performed by: INTERNAL MEDICINE

## 2023-03-02 PROCEDURE — 3077F PR MOST RECENT SYSTOLIC BLOOD PRESSURE >= 140 MM HG: ICD-10-PCS | Mod: CPTII,,, | Performed by: INTERNAL MEDICINE

## 2023-03-02 PROCEDURE — 3008F BODY MASS INDEX DOCD: CPT | Mod: CPTII,,, | Performed by: INTERNAL MEDICINE

## 2023-03-02 PROCEDURE — 99999 PR PBB SHADOW E&M-EST. PATIENT-LVL III: ICD-10-PCS | Mod: PBBFAC,,, | Performed by: INTERNAL MEDICINE

## 2023-03-02 PROCEDURE — 1159F PR MEDICATION LIST DOCUMENTED IN MEDICAL RECORD: ICD-10-PCS | Mod: CPTII,,, | Performed by: INTERNAL MEDICINE

## 2023-03-02 PROCEDURE — 99214 PR OFFICE/OUTPT VISIT, EST, LEVL IV, 30-39 MIN: ICD-10-PCS | Mod: S$PBB,,, | Performed by: INTERNAL MEDICINE

## 2023-03-02 NOTE — PROGRESS NOTES
"  ESTABLISHED PATIENT VISIT (LOV 5.24.22 with Dr. Cortes)    Orville Erwin  is a pleasant 32 y.o. male  with history of prior smoking history, diagnosed POOL, scheduled gastric bypass in future, anxiety and hypertension. The patient was diagnosed with OPOL at 8 years old, per his report, and has been using his CPAP since that time.     Here today for: follow up of POOL. Patient was retested for Pool with sleep study in July 2022 at [facility]. He received a new machine following his recent sleeps leonarddy, in appx August 2022. Patient has several complaints with CPAP machine. The patient states that, prior to receiving his new machine in August, he received new masks every month from [facility] He states that, since getting his new machine, he has had problems receiving his new masks on time and has only had two masks delivered to him. He reports that he typically can use a mask for one month before it breaks down and the seal no longer works. He has, therefore, had to glue his masks to keep the seal working appropriately.     Plan last visit :      Since last visit:     CPAP titration 6/30/22 at [facility]: available in care everywhere: 4-16 cwp, Rx 16cwp  Received new machine in august  Had study done at [facility]  Having trouble getting new supplies    PAP history   Problems Trouble getting new supplies   Mask FFM   Pressure    DME [facility] (would like to switch back to [facility])   Machine age 2022   Download 3/1/2023: 30/30 x [id number], CPAP =18, Leak 29/85/113, AHI 9.8 (unk 9.8)       SLEEP SCHEDULE   Environment    Bed Time 10PM-midnight (however variable based upon prn job)   Sleep Latency 6-8 hours   Arousals 1 per night on average   Nocturia No   Back to sleep No   Wake time 630 AM   Naps    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 Rescue, Disp: 18 g, Rfl: 0    fluticasone propionate (FLONASE) 50 mcg/actuation nasal spray, 1 spray (50 mcg total) by Each Nostril route once daily., Disp: 15.8 mL, Rfl: 0    hydrOXYzine pamoate (VISTARIL) 25 MG Cap, Take 1 capsule (25 mg total) by mouth every 8 (eight) hours as needed (anxiety)., Disp: 30 capsule, Rfl: 0    loratadine (CLARITIN) 10 mg tablet, Take 1 tablet (10 mg total) by mouth once daily., Disp: 30 tablet, Rfl: 0    acetaminophen (TYLENOL) 500 MG tablet, Take 2 tablets (1,000 mg total) by mouth every 6 (six) hours as needed for Pain. (Patient not taking: Reported on 5/25/2022), Disp: 30 tablet, Rfl: 0    budesonide-formoterol 160-4.5 mcg (SYMBICORT) 160-4.5 mcg/actuation HFAA, Inhale 2 puffs into the lungs every 12 (twelve) hours. Controller (Patient not taking: Reported on 5/25/2022), Disp: 1 Inhaler, Rfl: 0    diclofenac sodium (VOLTAREN) 1 % Gel, Apply 2 g topically 4 (four) times daily as needed (Apply to painful area up to 4 times a day as needed for pain). Apply to painful area 4 times a day as needed for pain, Disp: 1 Tube, Rfl: 0    fluticasone-salmeterol diskus inhaler 250-50 mcg, Inhale 1 puff into the lungs 2 (two) times daily. (Patient not taking: Reported on 5/25/2022), Disp: 60 each, Rfl: 5    ibuprofen (ADVIL,MOTRIN) 600 MG tablet, Take 1 tablet (600 mg total) by mouth every 6 (six) hours as needed for Pain (Take with food as needed for mild-to-moderate pain). (Patient not taking: Reported on 5/25/2022), Disp: 20 tablet, Rfl: 0    lisinopriL (PRINIVIL,ZESTRIL) 20 MG tablet, TAKE 1 TABLET(20 MG) BY  MOUTH EVERY DAY (Patient not taking: Reported on 5/25/2022), Disp: 30 tablet, Rfl: 0    methocarbamoL (ROBAXIN) 750 MG Tab, Take 1 tablet (750 mg total) by mouth 3 (three) times daily as needed (As needed for muscle spasm). As needed for muscle spam. (Patient not taking: Reported on 5/25/2022), Disp: 30 tablet, Rfl: 0       Vitals:    03/02/23 1031   Weight: (!) 192 kg (423 lb 4.5 oz)     Physical Exam:    GEN:   Well-appearing  Psych:  Appropriate affect, demonstrates insight  SKIN:  No rash on the face or bridge of the nose      LABS:  No results found for: HGB, CO2    RECORDS REVIEWED PREVIOUSLY:  PSG Apr 2011: , titrated to 16cwp +O2    PROBLEM DESCRIPTION/ Sx on Presentation Interval Hx STATUS   Very severe CHARLY   Dx age 8  Has done well on CPAP   Good usage  AHI elevated  Partially controlled   Daytime Sx   denies sleepiness when inactive   ESS n/a/24 on intake     Other issues: HTN, CHARLY dx age 10 (CPAP 15cwp), s/p Tonsillectomy,BMI >50, HTN    PLAN     -will proceed with BiPAP titration due to CPAP intolerance, ineffetive CPAP at pressures of 18 with residual AHI of 9  -continue CPAP =18 for now  -needs new mask and supplies  -using and benefitting from PAP therapy    RTC  3 months

## 2023-03-11 ENCOUNTER — HOSPITAL ENCOUNTER (OUTPATIENT)
Dept: SLEEP MEDICINE | Facility: HOSPITAL | Age: 33
Discharge: HOME | End: 2023-03-11
Attending: INTERNAL MEDICINE
Payer: MEDICAID

## 2023-03-11 DIAGNOSIS — Z99.89 INSUFFICIENT TREATMENT WITH NASAL CPAP: ICD-10-CM

## 2023-03-11 DIAGNOSIS — G47.33 OSA (OBSTRUCTIVE SLEEP APNEA): ICD-10-CM

## 2023-03-11 PROCEDURE — 95811 POLYSOM 6/>YRS CPAP 4/> PARM: CPT

## 2023-03-12 NOTE — PROGRESS NOTES
A BiPAP titration study was performed on Orville Matute. The entire procedure was explained, including Bio calibration procedure, patient was informed that there may be a need to enter the room during the night to fix lead or make adjustments to the equipment. Questions were answered prior to start of study. He  was given instructions on how to call out for help including how to use the nurse call unit in the bathroom. Patient was given [employer] phone number to call if patient needed tech for anything, in case tech was out of tech room.   Patient education was performed. This includes the possible use of CPAP machine and different CPAP masks. He was given the after visit summary.   The lowest oxygen saturation observed during sleep was 80%   EKG: The EKG appeared to be NSR  Technical difficulties; TIR to tape pulse ox, fix E2 lead pt slept with his hand on his face. Tech in room to fix mask leak, pt pulled at mask and covered exhalation port with his hand. Mask leaked with higher pressure  The patient was titrated from BiPAP 16/12 (Drs Orders)  to  Bipap 25/18. [employer]plus full face mask large was used. The pressure of  Bipap 25/18  is believed to eliminate most of the events. Supine REM sleep was obtained during the study.  The  patient says he does not sleep supine at home. His reaction to PAP was it was ok

## 2023-03-28 ENCOUNTER — PATIENT MESSAGE (OUTPATIENT)
Dept: SLEEP MEDICINE | Facility: CLINIC | Age: 33
End: 2023-03-28

## 2023-03-28 DIAGNOSIS — Z78.9 INTOLERANCE OF CONTINUOUS POSITIVE AIRWAY PRESSURE (CPAP) VENTILATION: ICD-10-CM

## 2023-03-28 DIAGNOSIS — G47.33 OSA (OBSTRUCTIVE SLEEP APNEA): Primary | ICD-10-CM

## 2023-03-28 PROCEDURE — 95811 PR POLYSOMNOGRAPHY W/CPAP: ICD-10-PCS | Mod: 26,,, | Performed by: INTERNAL MEDICINE

## 2023-03-28 PROCEDURE — 95811 POLYSOM 6/>YRS CPAP 4/> PARM: CPT | Mod: 26,,, | Performed by: INTERNAL MEDICINE

## 2023-03-28 NOTE — PROCEDURES
"[facility]    Titration Interpretation Report    Patient Name:  Orville Erwin  MRN#:  [id number]  :  1990  Study Date:  3/11/2023  Referring Provider:  Louie Núñez MD    The patient is a 32 year old Male who is 6' 1" and weighs 423.0 lbs.  His BMI equals 56.1.  A full night PAP titration was performed.    Polysomnogram Data  A full night polysomnogram recorded the standard physiologic parameters including EEG, EOG, EMG, EKG, nasal and oral airflow.  Respiratory parameters of chest and abdominal movements were recorded with Peizo-Crystal motion transducers.  Oxygen saturation was recorded by pulse oximetry.    Titration Summary  The patient was titrated at pressures ranging from 16/12/0* cm/H20 with supplemental oxygen at - up to 25/18/0* cm/H20 with supplemental oxygen at -.  The last pressure used in the study was 25/18/0* cm/H20 with supplemental oxygen at -.    Sleep Architecture  The total recording time of the polysomnogram was 401.4 minutes.  The total sleep time was 341.0 minutes.  The patient spent 4.7% of total sleep time in Stage N1, 64.5% in Stage N2, 1.0% in Stages N3, and 29.8% in REM.  Sleep latency was 13.1 minutes.  REM latency was 55.0 minutes.  Sleep Efficiency was 85.0%.  Total wake time was 61.0 minutes for a total wake percentage of 12.1%.  Wake after Sleep Onset was 47.0 minutes.    Respiratory Summary  The polysomnogram revealed a presence of 22 obstructive, - central, and - mixed apneas resulting in Total Apnea index of 3.9 events per hour.  There were 91 hypopneas resulting in Total Hypopnea index of 16.0 events per hour.  The combined Apnea/Hypopnea index was 19.9 events per hour.  There were a total of 11 RERA events resulting in a Respiratory Disturbance Index (RDI) of 21.8 events per hour.     Mean oxygen saturation was 94.7%.  The lowest oxygen saturation during sleep was 77.0%.  Time spent ?88% oxygen saturation was 24.6 minutes (6.1%).    Limb " "Movement Activity  There were - limb movements recorded.      Cardiac: single lead EKG revealed normal sinus rhythm    BiPAP titration:  Mask used in the study: Emilia View  BiPAP = 17/12 cwp was effective in lateral REM sleep.  BiPAP = 21/16 cwp was effective in supine REM apart from intermittent breakthrough apneas..    Oxygenation:  At therapeutic levels of PAP therapy, there was no baseline hypoxemia.    Impression:  -obstructive sleep apnea   -CPAP intolerance    Recommendations:    -auto-BiPAP with EPAP min = 12 cwp, PS = 5, and EPAP max =  25 cwp is recommended  -pressures should be adjusted to EPAP min = 16 cwp  or higher depending on pressure tolerance.  -the patient has follow up with Sleep Medicine        Louie Núñez MD    (This Sleep Study was interpreted by a Board Certified Sleep Specialist who conducted an epoch-by-epoch review of the entire raw data recording.)  (The indication for this sleep study was reviewed and deemed appropriate by AASM Practice Parameters or other reasons by a Board Certified Sleep Specialist.)          Titration PSG Report  [facility] [address], [place] [address]  Phone: [phone]  Fax: [phone]     Patient Name: Orville Erwin Study Date: 3/11/2023   YOB: 1990 Patient MRN: [id number]   Age:  32 year Hospital #: [id number]   Sex: Male Referring Physician: Louie Núñez MD   Height: 6' 1" Recording Tech: Yancy Woody   Weight: 423.0 lbs Scoring Tech: ROMAN Goode RRT, RPSGT   BMI:  56.1 AASM  1A   AHI: 19.9 Interpreting Physician Sánchez Nolasco MD   RERA index: 21.8   Low oxygen saturation: 76.0%   RDI: 1.9               Sleep architecture: This is a CPAP titration study. At light's out, the patient fell asleep in 13.1 minutes. Sleep efficiency was 85.0%. Total sleep time (TST) was 341.0 minutes. REM latency was 55.0 minutes.    Motor movement / Parasomnia: There were no significant  limb movements of sleep " noted. The total limb movement index was - (- with arousal).     Cardiac: Cardiac monitoring revealed a sinus rthythm  with occasional PAC's and PVC's.    CPAP  titration: The patient qualified for split night. During the treatment portion of the study, CPAP  (continuous positive airway pressure) was explored from  to  cm of water using a  Emilia View   mask, chin strap, CFlex at 3, and heated humidification. Initial improvement was observed on  cm H2O controlling respiratory events in supine NREM sleep. Effective control of sleep disordered breathing  was seen during supine REM sleep on  cm H2O. Final AHI at this pressure was below 5/hour.    Higher pressures were tolerated.     Patient perception: Following the study, the patient indicated regarding CPAP,   .  On a post-sleep study questionnaire, the patient indicated that sleep was the same in the lab than compared to home.    IMPRESSION: CHARLY (G47.33). Effective control of sleep disordered breathing was achieved with CPAP  at  cm of water.    RECOMMENDATION: CPAP  at  cm, mask of patient's choice, chin strap, and heated humidification, which is essential in conjunction with PAP to prevent airway drying and irritation.                            Study Overview    First Lights Off: 09:33:27 PM  COUNT INDEX   Last Lights On: 05:14:49 AM Awakenings: 20 3.5   Time in Bed: 401.4 Arousals: 177 31.1   Total Sleep Time: 341.0 Apneas & Hypopneas: 113 19.9   Sleep Efficiency: 85.0% Limb Movements: - -   Sleep Period Time: 388.5 Snores: - -   Sleep Maintenance Efficiency: 87.8% Desaturations: 79 13.9   Sleep Latency: 13.1      REM Latency from Sleep Onset: 55.0 Minimum Oxygen Saturation:  76.0%      Sleep Architecture                   % of Time in Bed  Stages TIME (mins) % SLEEP TIME   WAKE 61.0    Stage N1 16.0 4.7%   Stage N2 220.0 64.5%   Stage N3 3.5 1.0%   .5 29.8%     Arousal Summary     NREM REM Total Sleep Time   Apnea & Hypopnea Arousals 62 - 62   PLM  Arousals - - -   Isolated Limb Movement Arousals - - -   Spontaneous Arousals 101 6 107   RERAs  11 - 11   Total 171 6 177   Arousal Index 42.8 3.5 31.1         Respiratory Summary     By Sleep Stage By Body Position TOTAL    NREM REM SUPINE NON-SUPINE    Time (min) 239.5 101.5 187.0 154.0 341.0           Obstructive Apnea 20 2 18 4 22   Mixed Apnea - - - - -   Central Apnea - - - - -   Total Apneas 20 2 18 4 22   Total Apnea Index 5.0 1.2 5.8 1.6 3.9           Hypopneas w/Arousals 54 - 47 7 54           RERAs 11 - 3 8 11   RERA Index 2.8 - 1.0 3.1 1.9           AHI     <Apnea/Hypopnea Index (AHI)>     Respiratory Event Durations     Apnea Hypopnea    NREM REM NREM REM   Average (seconds) 16.2 15.5 19.2 -   Maximum (seconds) 21.1 17.3 30.2 -       Oxygen Saturation Summary     WAKE NREM REM   Average OSat (%) 95.1% 93.6% 97.1%   Minimum OSat (%) 76.0% 77.0% 90.0%   Maximum OSat (%) 100.0% 100.0% 99.0%                            Oxygen Saturation Distribution    Range(%) Time in range (min) Time in range (%)    90.0 - 100.0 357.7 89.1%   80.0 - 90.0 43.5 10.8%   70.0 - 80.0 0.1 0.0%   60.0 - 70.0 - -   50.0 - 60.0 - -   0.0 - 50.0 - -              Time Spent Less than 88% OSat    Range(%) Time in range (min) Time in range (%)   0.0 - 88.0 24.6 6.1%     # of Desaturations 79   Minimum Oxygen Saturation During Desaturation 77.0%      SUPINE LEFT RIGHT PRONE   Minimum Oxygen Saturation During Desaturation by BP 77.0% - 82.0% -     Limb Movement Summary      COUNT INDEX   Isolated Limb Movements - -   Periodic Limb Movements (PLMs) - -   Total Limb Movements - -     Cardiac Summary     WAKE NREM REM Sleep TOTAL    Average Pulse Rate (BPM) - - - - -   Minimum Pulse Rate (BPM) - - - - -   Maximum Pulse Rate (BPM) - - - - -     Pulse Rate Distribution    Range(bpm) Time in range (min) Time in range (%)   0.0 - 40.0 - -   40.0 - 60.0 - -   60.0 - 80.0 - -   80.0 - 100.0 - -   100.0 - 120.0 - -   120.0 - 140.0 - -   140.0 -  200.0 - -       Titration Summary      PAP Device PAP Level O2 Level Time (min) Wake (min) NREM (min) REM (min) Sleep Eff% OA# CA# MA# Hyp# Hyp # (w/Ar/Ds) AHI AHI   (Hyp w/Ar/Ds) RERA RDI Min OSat LM# LM Index AR# AR Index   - Off - 0.5 0.5 0.0 0.0 0.0% - - - - - - - - - 96.0 - - - -   BiLevel 16/12/0 - 34.0 13.5 20.5 0.0 60.3% - - - 5 3 14.6 8.8 5 29.3 82.0 - - 10 29.3   BiLevel 17/12/0 - 69.0 11.5 40.0 17.5 83.3% - - - 3 3 3.1 3.1 4 7.3 82.0 - - 23 24.0   BiLevel 18/13/0 - 11.0 0.0 11.0 0.0 100.0% 4 - - 10 10 76.4 76.4 1 81.8 84.0 - - 18 98.2   BiLevel 19/14/0 - 19.0 2.5 16.5 0.0 86.8% 5 - - 15 12 72.7 61.8 1 76.4 81.0 - - 27 98.2   BiLevel 20/15/0 - 24.0 0.5 23.5 0.0 97.9% 1 - - 29 26 76.6 68.9 - 76.6 83.0 - - 38 97.0   BiLevel 21/16/0 - 61.5 2.5 7.5 51.5 95.9% 4 - - 2 2 6.1 6.1 - 6.1 76.0 - - 10 10.2   BiLevel 22/17/0 - 22.5 1.0 21.5 0.0 95.6% 4 - - 10 9 39.1 36.3 - 39.1 77.0 - - 16 44.7   BiLevel 23/17/0 - 118.0 28.5 82.0 7.5 75.8% 4 - - 12 2 10.7 4.0 - 10.7 82.0 - - 21 14.1   BiLevel 25/18/0 - 42.5 0.5 17.0 25.0 98.8% - - - 5 3 7.1 4.3 - 7.1 87.0 - - 14 20.0

## 2023-04-11 ENCOUNTER — TELEPHONE (OUTPATIENT)
Dept: SLEEP MEDICINE | Facility: CLINIC | Age: 33
End: 2023-04-11
Payer: MEDICAID

## 2023-04-11 NOTE — TELEPHONE ENCOUNTER
Staff spoke with patient in regards to sleep study and provided him with access number to contact for update

## 2023-04-11 NOTE — TELEPHONE ENCOUNTER
----- Message from Elizabeth Modi sent at 4/11/2023 11:35 AM CDT -----  Regarding: CAll ABck  Name of Who is Calling: VITALIY MORALES [[id number]]              What is the request in detail: Pt requesting a call back about sleep test results. Please assist              Can the clinic reply by [facility]: No              [facility] [phone]

## 2023-04-12 ENCOUNTER — TELEPHONE (OUTPATIENT)
Dept: SLEEP MEDICINE | Facility: CLINIC | Age: 33
End: 2023-04-12
Payer: MEDICAID

## 2023-04-12 NOTE — TELEPHONE ENCOUNTER
----- Message from Dimple Veliz sent at 4/12/2023  3:10 PM CDT -----  Contact: VITALIY MORALES [[id number]]  Type: Call Back      Who called: VITALIY MORALES [[id number]]      What is the request in detail: Patient is requesting a call back. Pt states that he contacted access and was advised that they have not received the order. He is calling to follow up.   Please advise.     Can the clinic reply by [facility]? No      Would the patient rather a call back or a response via [facility]? Call back       Best call back number: [phone]      Additional Information:

## 2023-04-17 ENCOUNTER — TELEPHONE (OUTPATIENT)
Dept: SLEEP MEDICINE | Facility: CLINIC | Age: 33
End: 2023-04-17
Payer: MEDICAID

## 2023-04-17 NOTE — TELEPHONE ENCOUNTER
----- Message from Kiki Reynolds sent at 4/17/2023 11:38 AM CDT -----  Contact: VITALIY MORALES [[id number]]  Type: Call Back        Who called: VITALIY MORALES [[id number]]        What is the request in detail: Patient is requesting a call back. Pt states that he contacted access and was advised that they have not received the order. He is calling to follow up.   Please advise.      Can the clinic reply by [facility]? No        Would the patient rather a call back or a response via [facility]? Call back         Best call back number: [phone]        Additional Information: N/A

## 2023-04-20 DIAGNOSIS — G47.33 OSA (OBSTRUCTIVE SLEEP APNEA): Primary | ICD-10-CM

## 2023-09-28 NOTE — ADDENDUM NOTE
Addended by: MICHELLE CHAVARRIA on: 6/1/2018 03:06 AM     Modules accepted: Level of Service    
VTE Assessment already completed for this visit

## 2024-03-08 ENCOUNTER — HOSPITAL ENCOUNTER (EMERGENCY)
Facility: HOSPITAL | Age: 34
Discharge: HOME | End: 2024-03-09
Attending: EMERGENCY MEDICINE
Payer: MEDICAID

## 2024-03-08 DIAGNOSIS — M25.511 SHOULDER PAIN, RIGHT: ICD-10-CM

## 2024-03-08 DIAGNOSIS — S46.911A STRAIN OF RIGHT SHOULDER, INITIAL ENCOUNTER: Primary | ICD-10-CM

## 2024-03-08 PROCEDURE — 99284 EMERGENCY DEPT VISIT MOD MDM: CPT | Mod: ER

## 2024-03-08 RX ORDER — HYDROCHLOROTHIAZIDE 12.5 MG/1
12.5 TABLET ORAL
COMMUNITY
Start: 2023-05-15

## 2024-03-08 RX ORDER — METFORMIN HYDROCHLORIDE 750 MG/1
750 TABLET, EXTENDED RELEASE ORAL
COMMUNITY
Start: 2023-05-16

## 2024-03-09 VITALS
RESPIRATION RATE: 18 BRPM | SYSTOLIC BLOOD PRESSURE: 133 MMHG | DIASTOLIC BLOOD PRESSURE: 67 MMHG | HEIGHT: 73 IN | HEART RATE: 90 BPM | TEMPERATURE: 98 F | BODY MASS INDEX: 41.75 KG/M2 | OXYGEN SATURATION: 99 % | WEIGHT: 315 LBS

## 2024-03-09 PROCEDURE — 25000003 PHARM REV CODE 250: Mod: ER | Performed by: EMERGENCY MEDICINE

## 2024-03-09 PROCEDURE — 96372 THER/PROPH/DIAG INJ SC/IM: CPT | Performed by: EMERGENCY MEDICINE

## 2024-03-09 PROCEDURE — 63600175 PHARM REV CODE 636 W HCPCS: Mod: ER | Performed by: EMERGENCY MEDICINE

## 2024-03-09 RX ORDER — DICLOFENAC SODIUM 10 MG/G
GEL TOPICAL
Qty: 100 G | Refills: 0 | Status: SHIPPED | OUTPATIENT
Start: 2024-03-09

## 2024-03-09 RX ORDER — METHOCARBAMOL 750 MG/1
1500 TABLET, FILM COATED ORAL
Status: COMPLETED | OUTPATIENT
Start: 2024-03-09 | End: 2024-03-09

## 2024-03-09 RX ORDER — METHOCARBAMOL 750 MG/1
1500 TABLET, FILM COATED ORAL EVERY 6 HOURS
Qty: 24 TABLET | Refills: 0 | Status: SHIPPED | OUTPATIENT
Start: 2024-03-09 | End: 2024-03-12

## 2024-03-09 RX ORDER — KETOROLAC TROMETHAMINE 30 MG/ML
30 INJECTION, SOLUTION INTRAMUSCULAR; INTRAVENOUS
Status: COMPLETED | OUTPATIENT
Start: 2024-03-09 | End: 2024-03-09

## 2024-03-09 RX ORDER — DEXAMETHASONE SODIUM PHOSPHATE 4 MG/ML
8 INJECTION, SOLUTION INTRA-ARTICULAR; INTRALESIONAL; INTRAMUSCULAR; INTRAVENOUS; SOFT TISSUE
Status: COMPLETED | OUTPATIENT
Start: 2024-03-09 | End: 2024-03-09

## 2024-03-09 RX ORDER — KETOROLAC TROMETHAMINE 10 MG/1
10 TABLET, FILM COATED ORAL EVERY 6 HOURS PRN
Qty: 12 TABLET | Refills: 0 | Status: SHIPPED | OUTPATIENT
Start: 2024-03-09 | End: 2024-03-12

## 2024-03-09 RX ADMIN — KETOROLAC TROMETHAMINE 30 MG: 30 INJECTION, SOLUTION INTRAMUSCULAR at 01:03

## 2024-03-09 RX ADMIN — DEXAMETHASONE SODIUM PHOSPHATE 8 MG: 4 INJECTION, SOLUTION INTRA-ARTICULAR; INTRALESIONAL; INTRAMUSCULAR; INTRAVENOUS; SOFT TISSUE at 01:03

## 2024-03-09 RX ADMIN — METHOCARBAMOL 1500 MG: 750 TABLET ORAL at 01:03

## 2024-03-09 NOTE — DISCHARGE INSTRUCTIONS
You may use sling for no longer than one week.  While wearing sling, remove arm 3 times daily to perform full range of motion exercise to prevent frozen shoulder.

## 2024-03-09 NOTE — ED PROVIDER NOTES
Encounter Date: 3/8/2024       History     Chief Complaint   Patient presents with    Shoulder Pain     C/o right shoulder pain x2 days. Pt reports pain is now worse at 7/10 with activity. Pt has limited ROM. Denies injury/fall. Denies taking any pain meds pta. No chest pain/SOB.     50 y.o. male with hypertension, thyroid disease, elevated BMI and others presents emergency department complaining of acute, nontraumatic, right shoulder pain that began two days ago.  Reports waking from sleep when he noticed the pain.  He states he works as an  and is right-hand dominant but does not recall any recent strenuous activity, repetitive use activity or other incident that could have triggered the pain.   Denies prior history of shoulder injury or surgery.    He denies fever, rash, neck pain, back pain, chest pain, shortness of breath, paresthesias, or extremity weakness.  Denies tobacco use  Denies IVDU    The history is provided by the patient.     Review of patient's allergies indicates:  No Known Allergies  Past Medical History:   Diagnosis Date    Hypertension     Obesity     Sleep apnea      Past Surgical History:   Procedure Laterality Date    hypoxemia 2016    hospitalized for 4day    TONSILLECTOMY      as child     Family History   Problem Relation Age of Onset    Seizures Mother     Hypertension Father     Diabetes Paternal Grandmother     Constipation Daughter     Alzheimer's disease Maternal Grandmother     Prostate cancer Maternal Grandfather      Social History     Tobacco Use    Smoking status: Some Days     Current packs/day: 0.50     Average packs/day: 0.5 packs/day for 10.8 years (5.4 ttl pk-yrs)     Types: Cigarettes, Cigars     Start date: 5/25/2013    Smokeless tobacco: Former   Substance Use Topics    Alcohol use: No     Comment: socially    Drug use: No     Review of Systems   Constitutional:  Negative for fever.   Respiratory:  Negative for shortness of breath.    Cardiovascular:   Negative for chest pain.   Musculoskeletal:  Positive for arthralgias. Negative for back pain, gait problem, joint swelling and neck pain.   Skin:  Negative for color change, rash and wound.   Neurological:  Negative for weakness and numbness.     Physical Exam     Initial Vitals [03/08/24 2350]   BP Pulse Resp Temp SpO2   (!) 157/85 94 20 98.3 °F (36.8 °C) 97 %      MAP       --         Nursing note and vitals reviewed.  Constitutional: He appears well-developed and well-nourished. He is not diaphoretic. He is Obese . He is cooperative.  Non-toxic appearance. No distress.   HENT:   Head: Normocephalic and atraumatic.   Mouth/Throat: Oropharynx is clear and moist.   Eyes: Conjunctivae are normal.   Neck: Phonation normal. No stridor present.   Normal range of motion.  Cardiovascular:  Regular rhythm and intact distal pulses.           Pulses:       Radial pulses are 2+ on the right side and 2+ on the left side.   Pulmonary/Chest: Effort normal. No accessory muscle usage or stridor. No tachypnea. No respiratory distress.   Abdominal: He exhibits no distension. There is no abdominal tenderness.   Musculoskeletal:      Right shoulder: Tenderness (flexion and abduction reproduces pain) present. No swelling, deformity, effusion, laceration, bony tenderness or crepitus. Decreased range of motion (limited flexion greater than 90 degrees).      Right upper arm: Normal.      Right elbow: Normal.      Right forearm: Normal.      Right wrist: Normal.      Right hand: Normal.        Arms:       Cervical back: Normal range of motion.    Physical Exam    Musculoskeletal:        Arms:       Comments: Upper extremity compartments soft. Skin intact. There is no edema, erythema, induration, warmth, or crepitus.          Neurological: He is alert and oriented to person, place, and time. He has normal strength. Gait normal. GCS eye subscore is 4. GCS verbal subscore is 5. GCS motor subscore is 6.   Skin: Skin is warm and intact. No  abrasion, no bruising, no ecchymosis, no laceration, no rash and no abscess noted. No erythema.   Psychiatric: He has a normal mood and affect.     ED Course   Procedures  Labs Reviewed - No data to display       Imaging Results              X-Ray Shoulder Trauma Right (Final result)  Result time 03/09/24 00:52:25      Final result by Tito Gibbs MD (03/09/24 00:52:25)                   Impression:      See above.      Electronically signed by: Tito Gibbs MD  Date:    03/09/2024  Time:    00:52               Narrative:    EXAMINATION:  XR SHOULDER TRAUMA 3 VIEW RIGHT    CLINICAL HISTORY:  Pain in right shoulder    TECHNIQUE:  Three views of the right shoulder were performed.    COMPARISON:  None    FINDINGS:  There is a small osseous density, possible previous fracture fragment or loose body seen adjacent to the superior aspect of the bony glenoid on AP projection.  Otherwise no acute displaced fracture or dislocation seen.                                       Medications   methocarbamoL tablet 1,500 mg (1,500 mg Oral Given 3/9/24 0119)   dexAMETHasone injection 8 mg (8 mg Intramuscular Given 3/9/24 0119)   ketorolac injection 30 mg (30 mg Intramuscular Given 3/9/24 0119)     Medical Decision Making  Amount and/or Complexity of Data Reviewed  Radiology: ordered.    Risk  Prescription drug management.                          Medical Decision Making:   Differential Diagnosis:   Fracture, contusion, dislocation, sprain, strain, spasm, arthritis (inflammatory vs infections vs reactive), bursitis, cellulitis, and others    Clinical Tests:   Radiological Study: Ordered and Reviewed  ED Management:  Imaging negative for acute abnormality. Imaging results, outpatient management plan, outpatient PCP/ortho follow up and ED return precautions discussed with patient with understanding and agreement.                Clinical Impression:  Final diagnoses:  [M25.511] Shoulder pain, right  [S46.911A] Strain of right  shoulder, initial encounter (Primary)          ED Disposition Condition    Discharge Stable          ED Prescriptions       Medication Sig Dispense Start Date End Date Auth. Provider    diclofenac sodium (VOLTAREN) 1 % Gel Apply 2g  to affected area every 6 hr as needed for pain. 100 g 3/9/2024 -- Nessa Warren MD    methocarbamoL (ROBAXIN) 750 MG Tab Take 2 tablets (1,500 mg total) by mouth every 6 (six) hours. for 3 days 24 tablet 3/9/2024 3/12/2024 Nessa Warren MD    ketorolac (TORADOL) 10 mg tablet Take 1 tablet (10 mg total) by mouth every 6 (six) hours as needed for Pain (take with food). 12 tablet 3/9/2024 3/12/2024 Nessa Warren MD          Follow-up Information       Follow up With Specialties Details Why Contact Info Additional Information    The nearest emergency department.  Go to  As needed, If symptoms worsen      Your PCP  Call  As needed, for ongoing care      [facility] Call  Monday morning, to schedule an appointment, for re-evaluation of today's complaint, and ongoing care [address]  [address]  [facility] [id number] [phone] Please park in surface lot and use [facility] entrance. Check in at main registration.              Nessa Warren MD  03/14/24 0643

## 2024-07-02 ENCOUNTER — HOSPITAL ENCOUNTER (EMERGENCY)
Facility: HOSPITAL | Age: 34
Discharge: HOME | End: 2024-07-02
Attending: EMERGENCY MEDICINE
Payer: MEDICAID

## 2024-07-02 VITALS
TEMPERATURE: 98 F | RESPIRATION RATE: 20 BRPM | SYSTOLIC BLOOD PRESSURE: 155 MMHG | WEIGHT: 315 LBS | DIASTOLIC BLOOD PRESSURE: 83 MMHG | HEART RATE: 94 BPM | BODY MASS INDEX: 41.75 KG/M2 | HEIGHT: 73 IN | OXYGEN SATURATION: 94 %

## 2024-07-02 DIAGNOSIS — J45.909 ASTHMA DUE TO SEASONAL ALLERGIES: ICD-10-CM

## 2024-07-02 DIAGNOSIS — R05.9 COUGH: ICD-10-CM

## 2024-07-02 DIAGNOSIS — J06.9 URI WITH COUGH AND CONGESTION: Primary | ICD-10-CM

## 2024-07-02 LAB
CTP QC/QA: YES
INFLUENZA A ANTIGEN, POC: NEGATIVE
INFLUENZA B ANTIGEN, POC: NEGATIVE
POC RAPID STREP A: NEGATIVE
POCT GLUCOSE: 162 MG/DL (ref 70–110)
SARS-COV-2 RDRP RESP QL NAA+PROBE: NEGATIVE

## 2024-07-02 PROCEDURE — 87804 INFLUENZA ASSAY W/OPTIC: CPT | Mod: 59,ER

## 2024-07-02 PROCEDURE — 96372 THER/PROPH/DIAG INJ SC/IM: CPT | Performed by: EMERGENCY MEDICINE

## 2024-07-02 PROCEDURE — 82962 GLUCOSE BLOOD TEST: CPT | Mod: ER

## 2024-07-02 PROCEDURE — 25000003 PHARM REV CODE 250: Mod: ER | Performed by: EMERGENCY MEDICINE

## 2024-07-02 PROCEDURE — 87880 STREP A ASSAY W/OPTIC: CPT | Mod: ER

## 2024-07-02 PROCEDURE — 25000242 PHARM REV CODE 250 ALT 637 W/ HCPCS: Mod: ER | Performed by: EMERGENCY MEDICINE

## 2024-07-02 PROCEDURE — 99284 EMERGENCY DEPT VISIT MOD MDM: CPT | Mod: 25,ER

## 2024-07-02 PROCEDURE — 87635 SARS-COV-2 COVID-19 AMP PRB: CPT | Mod: ER | Performed by: EMERGENCY MEDICINE

## 2024-07-02 PROCEDURE — 63600175 PHARM REV CODE 636 W HCPCS: Mod: ER | Performed by: EMERGENCY MEDICINE

## 2024-07-02 RX ORDER — ALBUTEROL SULFATE 2.5 MG/.5ML
2.5 SOLUTION RESPIRATORY (INHALATION)
Status: COMPLETED | OUTPATIENT
Start: 2024-07-02 | End: 2024-07-02

## 2024-07-02 RX ORDER — ALBUTEROL SULFATE 90 UG/1
2 AEROSOL, METERED RESPIRATORY (INHALATION) EVERY 6 HOURS PRN
Qty: 18 G | Refills: 0 | Status: SHIPPED | OUTPATIENT
Start: 2024-07-02

## 2024-07-02 RX ORDER — LORATADINE 10 MG/1
10 TABLET ORAL EVERY MORNING
Qty: 60 TABLET | Refills: 0 | Status: SHIPPED | OUTPATIENT
Start: 2024-07-02 | End: 2025-07-02

## 2024-07-02 RX ORDER — FLUTICASONE PROPIONATE 50 MCG
2 SPRAY, SUSPENSION (ML) NASAL DAILY
Qty: 16 G | Refills: 0 | Status: SHIPPED | OUTPATIENT
Start: 2024-07-02

## 2024-07-02 RX ORDER — MONTELUKAST SODIUM 10 MG/1
10 TABLET ORAL NIGHTLY
Qty: 30 TABLET | Refills: 0 | Status: SHIPPED | OUTPATIENT
Start: 2024-07-02 | End: 2024-08-01

## 2024-07-02 RX ORDER — ACETAMINOPHEN 500 MG
1000 TABLET ORAL
Status: COMPLETED | OUTPATIENT
Start: 2024-07-02 | End: 2024-07-02

## 2024-07-02 RX ORDER — DEXAMETHASONE SODIUM PHOSPHATE 4 MG/ML
12 INJECTION, SOLUTION INTRA-ARTICULAR; INTRALESIONAL; INTRAMUSCULAR; INTRAVENOUS; SOFT TISSUE
Status: COMPLETED | OUTPATIENT
Start: 2024-07-02 | End: 2024-07-02

## 2024-07-02 RX ORDER — ALBUTEROL SULFATE 0.83 MG/ML
2.5 SOLUTION RESPIRATORY (INHALATION) EVERY 6 HOURS PRN
Qty: 120 EACH | Refills: 0 | Status: SHIPPED | OUTPATIENT
Start: 2024-07-02 | End: 2025-07-02

## 2024-07-02 RX ORDER — OLOPATADINE HYDROCHLORIDE 1 MG/ML
1 SOLUTION/ DROPS OPHTHALMIC 2 TIMES DAILY
Qty: 5 ML | Refills: 0 | Status: SHIPPED | OUTPATIENT
Start: 2024-07-02 | End: 2024-08-01

## 2024-07-02 RX ORDER — GUAIFENESIN 100 MG/5ML
100-200 SOLUTION ORAL EVERY 4 HOURS PRN
Qty: 60 ML | Refills: 0 | Status: SHIPPED | OUTPATIENT
Start: 2024-07-02 | End: 2024-07-12

## 2024-07-02 RX ORDER — BENZONATATE 100 MG/1
100 CAPSULE ORAL 3 TIMES DAILY PRN
Qty: 20 CAPSULE | Refills: 0 | Status: SHIPPED | OUTPATIENT
Start: 2024-07-02 | End: 2024-07-12

## 2024-07-02 RX ORDER — GUAIFENESIN 100 MG/5ML
200 SOLUTION ORAL
Status: COMPLETED | OUTPATIENT
Start: 2024-07-02 | End: 2024-07-02

## 2024-07-02 RX ADMIN — GUAIFENESIN 200 MG: 200 SOLUTION ORAL at 02:07

## 2024-07-02 RX ADMIN — DEXAMETHASONE SODIUM PHOSPHATE 12 MG: 4 INJECTION INTRA-ARTICULAR; INTRALESIONAL; INTRAMUSCULAR; INTRAVENOUS; SOFT TISSUE at 02:07

## 2024-07-02 RX ADMIN — ACETAMINOPHEN 1000 MG: 500 TABLET ORAL at 01:07

## 2024-07-02 RX ADMIN — ALBUTEROL SULFATE 2.5 MG: 2.5 SOLUTION RESPIRATORY (INHALATION) at 02:07

## 2024-07-02 NOTE — ED PROVIDER NOTES
Encounter Date: 7/2/2024       History     Chief Complaint   Patient presents with    General Illness     Sore throat, runny nose, stuffy nose, chest congestion, headache and cough for 2 days.      34 y.o. male with elevated BMI, CHARLY (CPAP), HTN, prediabetes (off Metformin x one month) and others presents emergency department complaining of acute subjective fever, sore throat, nasal congestion, rhinorrhea, postnasal drip, productive cough with yellow sputum, sinus pressure during coughing spell and itchy/watery eyes that began 2-1/2 days ago.  He reports taking Tylenol for symptoms.  He denies dyspnea on exertion, appetite change, nausea, vomiting, diarrhea, urinary symptoms, chest pain or other acute complaint  Quit smoking 3 yrs ago      The history is provided by the patient.     Review of patient's allergies indicates:  No Known Allergies  Past Medical History:   Diagnosis Date    Hypertension     Obesity     Sleep apnea      Past Surgical History:   Procedure Laterality Date    hypoxemia  2016    hospitalized for 4day    TONSILLECTOMY      as child     Family History   Problem Relation Name Age of Onset    Seizures Mother      Hypertension Father      Diabetes Paternal Grandmother      Constipation Daughter      Alzheimer's disease Maternal Grandmother      Prostate cancer Maternal Grandfather       Social History     Tobacco Use    Smoking status: Some Days     Current packs/day: 0.50     Average packs/day: 0.5 packs/day for 11.1 years (5.6 ttl pk-yrs)     Types: Cigarettes, Cigars     Start date: 5/25/2013    Smokeless tobacco: Former   Substance Use Topics    Alcohol use: No     Comment: socially    Drug use: No     Review of Systems   Constitutional:  Positive for fever (subjective). Negative for appetite change.   HENT:  Positive for congestion, postnasal drip, rhinorrhea, sinus pressure and sore throat. Negative for drooling, sneezing, trouble swallowing and voice change.    Eyes:  Positive for discharge  (watery) and itching. Negative for photophobia, pain and visual disturbance.   Respiratory:  Positive for cough. Negative for shortness of breath.    Cardiovascular:  Negative for chest pain.   Gastrointestinal:  Negative for diarrhea, nausea and vomiting.   Genitourinary:  Negative for decreased urine volume, difficulty urinating, dysuria, frequency and hematuria.       Physical Exam     Initial Vitals [07/02/24 0122]   BP Pulse Resp Temp SpO2   (!) 153/85 98 (!) 22 99.9 °F (37.7 °C) (!) 94 %      MAP       --         Physical Exam    Nursing note and vitals reviewed.  Constitutional: He appears well-developed and well-nourished. He is not diaphoretic. He is Obese . No distress.   HENT:   Head: Normocephalic and atraumatic.   Mouth/Throat: Oropharynx is clear and moist.   Eyes: Conjunctivae are normal.   Neck: Phonation normal. No stridor present.   Normal range of motion.  Cardiovascular:  Regular rhythm and intact distal pulses.           Pulmonary/Chest: Effort normal. No accessory muscle usage or stridor. No tachypnea. No respiratory distress. He has decreased breath sounds. He has no wheezes. He has no rhonchi. He has no rales.   Abdominal: He exhibits no distension. There is no abdominal tenderness.   Musculoskeletal:         General: Edema (nonpitting BLE) present. No tenderness. Normal range of motion.      Cervical back: Normal range of motion.     Neurological: He is alert and oriented to person, place, and time. He has normal strength. Gait normal. GCS eye subscore is 4. GCS verbal subscore is 5. GCS motor subscore is 6.   Skin: Skin is warm and intact. No rash noted.   Psychiatric: He has a normal mood and affect.         ED Course   Procedures  Labs Reviewed   POCT GLUCOSE - Abnormal; Notable for the following components:       Result Value    POCT Glucose 162 (*)     All other components within normal limits   SARS-COV-2 RDRP GENE    Narrative:     This test utilizes isothermal nucleic acid  amplification technology to detect the SARS-CoV-2 RdRp nucleic acid segment. The analytical sensitivity (limit of detection) is 500 copies/swab.     A POSITIVE result is indicative of the presence of SARS-CoV-2 RNA; clinical correlation with patient history and other diagnostic information is necessary to determine patient infection status.    A NEGATIVE result means that SARS-CoV-2 nucleic acids are not present above the limit of detection. A NEGATIVE result should be treated as presumptive. It does not rule out the possibility of COVID-19 and should not be the sole basis for treatment decisions. If COVID-19 is strongly suspected based on clinical and exposure history, re-testing using an alternate molecular assay should be considered.     Commercial kits are provided by [employer].   _________________________________________________________________   The authorized Fact Sheet for Healthcare Providers and the authorized Fact Sheet for Patients of the ID NOW COVID-19 are available on the FDA website:    https://www.fda.gov/media/865917/download      https://www.fda.gov/media/532656/download       POCT STREP A, RAPID   POCT RAPID INFLUENZA A/B          Imaging Results              X-Ray Chest PA And Lateral (Final result)  Result time 07/02/24 06:45:00      Final result by RADIOLOGIST, VIRTUAL (07/02/24 06:45:00)                   Impression:      Mild right basilar density likely representing atelectasis.      Electronically signed by: Nayana Radiologist  Date:    07/02/2024  Time:    06:45               Narrative:    EXAMINATION:  Exam: XR Chest Exam date and time: 7/2/2024 1:57 AM    CLINICAL HISTORY:  Age: 34 years old Clinical indication: Cough    TECHNIQUE:  Imaging protocol: Radiologic exam of the chest. Views: 2 views.    COMPARISON:  No relevant prior studies available.    FINDINGS:  Lungs: There is increased density at the right lung base which may represent atelectasis or developing infiltrate.  This finding is only seen on the frontal view of the chest. No consolidation. Pleural spaces: No pleural effusion. No pneumothorax. Heart/Mediastinum: No cardiomegaly. Bones/joints: Unremarkable for age.                                       Medications   acetaminophen tablet 1,000 mg (1,000 mg Oral Given 7/2/24 0137)   guaiFENesin 100 mg/5 ml syrup 200 mg (200 mg Oral Given 7/2/24 0235)   albuterol sulfate nebulizer solution 2.5 mg (2.5 mg Nebulization Given by Other 7/2/24 0238)   dexAMETHasone injection 12 mg (12 mg Intramuscular Given 7/2/24 0250)     Medical Decision Making  Amount and/or Complexity of Data Reviewed  Labs: ordered.  Radiology: ordered.    Risk  OTC drugs.  Prescription drug management.               ED Course as of 07/04/24 0027   Tue Jul 02, 2024   0323 Symptoms improved with ED treatment. CXR pending at time of ED disposition. Awaiting [facility] interpretation [DL]      ED Course User Index  [DL] Nessa Warren MD               Medical Decision Making:   Differential Diagnosis:   COVID 19 infection, influenza infection, strep pharyngitis, acute otitis media, sinusitis, tonsillitis, rhinosinusitis, bronchiolitis, bronchitis, other viral illness, and others    Clinical Tests:   Lab Tests: Ordered and Reviewed  Radiological Study: Ordered and Reviewed  ED Management:  Symptoms improved with ED treatment. Test results, imaging results, outpatient management plan, outpatient PCP follow up and ED return precautions discussed with patient with understanding and agreement.                Clinical Impression:  Final diagnoses:  [R05.9] Cough  [J06.9] URI with cough and congestion (Primary)  [J45.909] Asthma due to seasonal allergies          ED Disposition Condition    Discharge Stable          ED Prescriptions       Medication Sig Dispense Start Date End Date Auth. Provider    fluticasone propionate (FLONASE) 50 mcg/actuation nasal spray 2 sprays (100 mcg total) by Each Nostril route once daily. 16 g  7/2/2024 -- Nessa Warren MD    montelukast (SINGULAIR) 10 mg tablet Take 1 tablet (10 mg total) by mouth every evening. 30 tablet 7/2/2024 8/1/2024 Nessa Warren MD    loratadine (CLARITIN) 10 mg tablet Take 1 tablet (10 mg total) by mouth every morning. 60 tablet 7/2/2024 7/2/2025 Nessa Warren MD    benzonatate (TESSALON) 100 MG capsule Take 1 capsule (100 mg total) by mouth 3 (three) times daily as needed for Cough. 20 capsule 7/2/2024 7/12/2024 Nessa Warren MD    albuterol (PROVENTIL/VENTOLIN HFA) 90 mcg/actuation inhaler Inhale 2 puffs into the lungs every 6 (six) hours as needed for Wheezing or Shortness of Breath. 18 g 7/2/2024 -- Nessa Warren MD    albuterol (PROVENTIL) 2.5 mg /3 mL (0.083 %) nebulizer solution Take 3 mLs (2.5 mg total) by nebulization every 6 (six) hours as needed for Wheezing or Shortness of Breath. Rescue 120 each 7/2/2024 7/2/2025 Nessa Warren MD    olopatadine (PATANOL) 0.1 % ophthalmic solution Place 1 drop into both eyes 2 (two) times daily. 5 mL 7/2/2024 8/1/2024 Nessa Warren MD    guaiFENesin 100 mg/5 ml (ROBITUSSIN) 100 mg/5 mL syrup Take 5-10 mLs (100-200 mg total) by mouth every 4 (four) hours as needed for Cough or Congestion. 60 mL 7/2/2024 7/12/2024 Nessa Warren MD          Follow-up Information       Follow up With Specialties Details Why Contact Info    The nearest emergency department.  Go to  As needed, If symptoms worsen     Geni Fox MD Family Medicine Call today to schedule an appointment, for re-evaluation of today's complaint, and ongoing care [address] [facility]  [place] [address]  [phone]               Nessa Warren MD  07/04/24 0029

## 2024-09-28 ENCOUNTER — HOSPITAL ENCOUNTER (EMERGENCY)
Facility: HOSPITAL | Age: 34
Discharge: HOME | End: 2024-09-28
Attending: EMERGENCY MEDICINE
Payer: MEDICAID

## 2024-09-28 VITALS
DIASTOLIC BLOOD PRESSURE: 100 MMHG | SYSTOLIC BLOOD PRESSURE: 170 MMHG | TEMPERATURE: 98 F | HEIGHT: 73 IN | RESPIRATION RATE: 18 BRPM | WEIGHT: 315 LBS | BODY MASS INDEX: 41.75 KG/M2 | HEART RATE: 85 BPM | OXYGEN SATURATION: 98 %

## 2024-09-28 DIAGNOSIS — S46.011A ROTATOR CUFF STRAIN, RIGHT, INITIAL ENCOUNTER: ICD-10-CM

## 2024-09-28 DIAGNOSIS — R52 PAIN: ICD-10-CM

## 2024-09-28 DIAGNOSIS — M25.511 ACUTE PAIN OF RIGHT SHOULDER: Primary | ICD-10-CM

## 2024-09-28 DIAGNOSIS — J30.9 ALLERGIC RHINITIS, UNSPECIFIED SEASONALITY, UNSPECIFIED TRIGGER: ICD-10-CM

## 2024-09-28 PROCEDURE — 63600175 PHARM REV CODE 636 W HCPCS: Mod: ER

## 2024-09-28 PROCEDURE — 96372 THER/PROPH/DIAG INJ SC/IM: CPT

## 2024-09-28 PROCEDURE — 99284 EMERGENCY DEPT VISIT MOD MDM: CPT | Mod: 25,ER

## 2024-09-28 RX ORDER — LIDOCAINE 50 MG/G
1 PATCH TOPICAL DAILY
Qty: 15 PATCH | Refills: 0 | Status: SHIPPED | OUTPATIENT
Start: 2024-09-28

## 2024-09-28 RX ORDER — FLUTICASONE PROPIONATE 50 MCG
1 SPRAY, SUSPENSION (ML) NASAL 2 TIMES DAILY PRN
Qty: 15 G | Refills: 0 | Status: SHIPPED | OUTPATIENT
Start: 2024-09-28

## 2024-09-28 RX ORDER — METHOCARBAMOL 500 MG/1
1000 TABLET, FILM COATED ORAL 3 TIMES DAILY
Qty: 30 TABLET | Refills: 0 | Status: SHIPPED | OUTPATIENT
Start: 2024-09-28 | End: 2024-10-03

## 2024-09-28 RX ORDER — NAPROXEN 500 MG/1
500 TABLET ORAL 2 TIMES DAILY
Qty: 30 TABLET | Refills: 0 | Status: SHIPPED | OUTPATIENT
Start: 2024-09-28

## 2024-09-28 RX ORDER — CETIRIZINE HYDROCHLORIDE 10 MG/1
10 TABLET ORAL DAILY
Qty: 30 TABLET | Refills: 0 | Status: SHIPPED | OUTPATIENT
Start: 2024-09-28 | End: 2024-10-28

## 2024-09-28 RX ORDER — KETOROLAC TROMETHAMINE 30 MG/ML
30 INJECTION, SOLUTION INTRAMUSCULAR; INTRAVENOUS
Status: COMPLETED | OUTPATIENT
Start: 2024-09-28 | End: 2024-09-28

## 2024-09-28 RX ADMIN — KETOROLAC TROMETHAMINE 30 MG: 30 INJECTION, SOLUTION INTRAMUSCULAR at 04:09

## 2024-09-28 NOTE — ED PROVIDER NOTES
Encounter Date: 9/28/2024    SCRIBE #1 NOTE: I, Sonia Gibson, am scribing for, and in the presence of,  Kenan Lara PA-C. I have scribed the following portions of the note - Other sections scribed: HPI,ROS,PE.       History     Chief Complaint   Patient presents with    Shoulder Pain     A 35 y/o male presents to the ER c/o (right) shoulder pain x 2 days. +LROM. Unknown origin of pain.      Orville Erwin is a 34 y.o. male, with a PMHx of HTN, who presents to the ED with arthralgias(of right shoulder) onset 2 days ago. 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 Patient reports that the pain is exacerbated when lifting his arm. Patient denies taking any medications to alleviate his symptoms. Patient notes that he has not taken his antihypertensive medications today. No other exacerbating or alleviating factors.     The history is provided by the patient. No  was used.     Review of patient's allergies indicates:  No Known Allergies  Past Medical History:   Diagnosis Date    Hypertension     Obesity     Sleep apnea      Past Surgical History:   Procedure Laterality Date    hypoxemia 2016    hospitalized for 4day    TONSILLECTOMY      as child     Family History   Problem Relation Name Age of Onset    Seizures Mother      Hypertension Father      Diabetes Paternal Grandmother      Constipation Daughter      Alzheimer's disease Maternal Grandmother      Prostate cancer Maternal Grandfather       Social History     Tobacco Use    Smoking status: Some Days     Current packs/day: 0.50     Average packs/day: 0.5 packs/day for 11.3 years (5.7 ttl pk-yrs)     Types: Cigarettes, Cigars     Start date: 5/25/2013    Smokeless tobacco: Former   Substance Use Topics    Alcohol use: No     Comment: socially    Drug use: No     Review of Systems   Constitutional:  Negative for chills, diaphoresis, fatigue and fever.    HENT:  Negative for congestion, ear discharge, ear pain, rhinorrhea, sore throat and trouble swallowing.    Eyes:  Negative for photophobia, redness and visual disturbance.   Respiratory:  Negative for cough and shortness of breath.    Cardiovascular:  Negative for chest pain, palpitations and leg swelling.   Gastrointestinal:  Negative for abdominal pain, diarrhea, nausea and vomiting.   Genitourinary:  Negative for difficulty urinating, dysuria, flank pain, frequency and hematuria.   Musculoskeletal:  Positive for arthralgias (right shoulder). Negative for back pain, joint swelling, myalgias, neck pain and neck stiffness.   Skin:  Negative for pallor and rash.   Neurological:  Negative for dizziness, weakness, light-headedness, numbness and headaches.   Hematological:  Does not bruise/bleed easily.       Physical Exam     Initial Vitals [09/28/24 1524]   BP Pulse Resp Temp SpO2   (!) 172/103 82 18 98.4 °F (36.9 °C) 96 %      MAP       --         Physical Exam    Nursing note and vitals reviewed.  Constitutional: He appears well-developed and well-nourished. He is not diaphoretic. He does not appear ill. No distress.   HENT:   Head: Normocephalic and atraumatic.   Right Ear: External ear normal.   Left Ear: External ear normal.   Nose: Mucosal edema (Cobblestoning) present. Mouth/Throat: Uvula is midline and oropharynx is clear and moist.   Eyes: Conjunctivae and EOM are normal. Pupils are equal, round, and reactive to light. Right eye exhibits no discharge. Left eye exhibits no discharge. No scleral icterus.   Neck: Trachea normal. Neck supple.   Normal range of motion.   Full passive range of motion without pain.     Cardiovascular:  Normal rate, regular rhythm, normal heart sounds, intact distal pulses and normal pulses.     Exam reveals no distant heart sounds and no friction rub.       Pulmonary/Chest: Effort normal and breath sounds normal. No respiratory distress.   Abdominal: Abdomen is soft. Bowel sounds  are normal. He exhibits no distension and no pulsatile midline mass. There is no abdominal tenderness.   No right CVA tenderness.  No left CVA tenderness. There is no rebound and no guarding.   Musculoskeletal:         General: Normal range of motion.      Right shoulder: Tenderness present.      Left shoulder: Normal.      Right elbow: Normal.      Left elbow: Normal.      Right wrist: Normal.      Left wrist: Normal.      Right hand: Normal.      Left hand: Normal.      Cervical back: Normal, full passive range of motion without pain, normal range of motion and neck supple.      Thoracic back: Normal.      Lumbar back: Normal.      Right hip: Normal.      Left hip: Normal.      Right knee: Normal.      Left knee: Normal.      Right lower leg: Normal.      Left lower leg: Normal.      Right ankle: Normal.      Left ankle: Normal.      Right foot: Normal.      Left foot: Normal.      Comments: Positive empty can  Positive drop arm  Positive Neer  Tenderness over AC joint.       Neurological: He is alert and oriented to person, place, and time. He has normal strength. No cranial nerve deficit or sensory deficit. Coordination and gait normal.   Skin: Skin is warm and dry. Capillary refill takes less than 2 seconds. No bruising, no ecchymosis and no rash noted. No erythema.   Psychiatric: He has a normal mood and affect. His speech is normal and behavior is normal. Thought content normal.         ED Course   Procedures  Labs Reviewed - No data to display       Imaging Results              X-Ray Shoulder Trauma Right (Final result)  Result time 09/28/24 16:27:47      Final result by Misael Light MD (09/28/24 16:27:47)                   Impression:      1. No acute displaced fracture or dislocation of the right shoulder.      Electronically signed by: Misael Light MD  Date:    09/28/2024  Time:    16:27               Narrative:    EXAMINATION:  XR SHOULDER TRAUMA 3 VIEW RIGHT    CLINICAL HISTORY:  Pain,  unspecified    TECHNIQUE:  Three or four views of the right shoulder were performed.    COMPARISON:  03/09/2024    FINDINGS:  Three views right shoulder.    The right humeral head maintains appropriate relationship with the glenoid noting glenohumeral degenerative change.  The acromioclavicular joint is intact.  No acute displaced right rib fracture.  The right lung zones are clear.                                       Medications   ketorolac injection 30 mg (30 mg Intramuscular Given 9/28/24 1620)     Medical Decision Making  34 y.o. male, with a PMHx of HTN, who presents to the ED with arthralgias(of right shoulder) onset 2 days ago. 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 Patient reports that the pain is exacerbated when lifting his arm. Patient denies taking any medications to alleviate his symptoms. Patient notes that he has not taken his antihypertensive medications today. No other exacerbating or alleviating factors.   Patient was also brings to my attention for the past several months he has been having nasal congestion in his not been taking any medication for it.  Patient states he used Flonase for a few days but nothing worked.  Advised patient to continue using Flonase as it may take several weeks to see results and also advised using Zyrtec daily for his symptoms.  Patient's chart and medical history reviewed.  Patient's vitals reviewed.  They are afebrile, no respiratory distress, nontoxic-appearing in the ED.  On exam, positive empty can, positive drop arm and positive Neer. Tenderness over AC joint.  Differential diagnosis is considered the following.  - Septic Arthritis, Gout, Osteomyelitis: considered with pain, although unlikely without overlying erythema and swelling/edema, patient is afebrile  - Fracture/Dislocation: considered with pain, imaging ordered for further evaluation  - Contusion/Sprain/Strain: considered  with pain with ROM and exam findings as above.  Likely injury to the rotator cuff.  Sling applied for comfort.  Advised follow up with Orthopedics if symptoms continue or worsen.  - Compartment Syndrome: unlikely with 2+ distal pulses, no pallor, no paresthesias, no woody induration.     At this time I'll discharge home to follow up with primary care physician in the next 1-2 days for further evaluation.  If the pain continues the pt will need to see ortho for further evaluation.  The patient is comfortable with this plan and comfortable going home at this time. After taking into careful account the historical factors and physical exam findings of the patient's presentation today, in conjunction with the empirical and objective data obtained on ED workup, no acute emergent medical condition has been identified. The patient appears to be low risk for an emergent medical condition and I feel it is safe and appropriate at this time for the patient to be discharged to follow-up as detailed in their discharge instructions for reevaluation and possible continued outpatient workup and management. I have discussed the specifics of the workup with the patient and the patient has verbalized understanding of the details of the workup, the diagnosis, the treatment plan, and the need for outpatient follow-up.  Although the patient has no emergent etiology today this does not preclude the development of an emergent condition so in addition, I have advised the patient that they can return to the ED and/or activate EMS at any time with worsening of their symptoms, change of their symptoms, or with any other medical complaint.  The patient remained comfortable and stable during their visit in the ED.  Discharge and follow-up instructions discussed with the patient who expressed understanding and willingness to comply with my recommendations. I discussed with the patient/family the diagnosis, treatment plan, indications for return to  the emergency department, and for expected follow-up. Please follow up with your primary doctor in 1-2 days and return to the ED in any new, worsening, or continued symptoms. The patient/family verbalized an understanding. The patient/family is asked if there are any questions or concerns. We discuss the case, until all issues are addressed to the patient/family's satisfaction. Patient/family understands and is agreeable to the plan.    KENAN DEL CASTILLO PA-C    DISCLAIMER: This note was prepared with [employer] voice recognition transcription software. Garbled syntax, mangled pronouns, and other bizarre constructions may be attributed to that software system.      Amount and/or Complexity of Data Reviewed  External Data Reviewed: notes.  Radiology: ordered and independent interpretation performed. Decision-making details documented in ED Course.    Risk  OTC drugs.  Prescription drug management.            Scribe Attestation:   Scribe #1: I performed the above scribed service and the documentation accurately describes the services I performed. I attest to the accuracy of the note.              I, Kenan Del Castillo PA-C, personally performed the services described in this documentation. All medical record entries made by the scribe were at my direction and in my presence. I have reviewed the chart and agree that the record reflects my personal performance and is accurate and complete.      DISCLAIMER: This note was prepared with [employer] voice recognition transcription software. Garbled syntax, mangled pronouns, and other bizarre constructions may be attributed to that software system.                   Clinical Impression:  Final diagnoses:  [R52] Pain  [M25.511] Acute pain of right shoulder (Primary)  [S46.011A] Rotator cuff strain, right, initial encounter  [J30.9] Allergic rhinitis, unspecified seasonality, unspecified trigger          ED Disposition Condition    Discharge Stable          ED Prescriptions        Medication Sig Dispense Start Date End Date Auth. Provider    cetirizine (ZYRTEC) 10 MG tablet Take 1 tablet (10 mg total) by mouth once daily. 30 tablet 9/28/2024 10/28/2024 Kenan Lara PA-C    naproxen (NAPROSYN) 500 MG tablet Take 1 tablet (500 mg total) by mouth 2 (two) times daily. 30 tablet 9/28/2024 -- Kenan Lara PA-C    methocarbamoL (ROBAXIN) 500 MG Tab Take 2 tablets (1,000 mg total) by mouth 3 (three) times daily. for 5 days 30 tablet 9/28/2024 10/3/2024 Kenan Lara PA-C    LIDOcaine (LIDODERM) 5 % Place 1 patch onto the skin once daily. Apply patch for 12 hours and then leave off for 12 hours 15 patch 9/28/2024 -- Kenan Lara PA-C    fluticasone propionate (FLONASE) 50 mcg/actuation nasal spray 1 spray (50 mcg total) by Each Nostril route 2 (two) times daily as needed. 15 g 9/28/2024 -- Kenan Lara PA-C          Follow-up Information       Follow up With Specialties Details Why Contact Info    Geni Fox MD Family Medicine Schedule an appointment as soon as possible for a visit in 1 day for follow up [address]  [place] [address]  [phone]      Berlin Tinoco Jr., MD Hand Surgery, Orthopedic Surgery Schedule an appointment as soon as possible for a visit  for orthopedic follow up, As needed, for follow up [address]  [address]  [facility] [address] [phone]      Kyle Carson MD Orthopedic Surgery, Hand Surgery Schedule an appointment as soon as possible for a visit  for orthopedics follow up, As needed, for follow up [address]  [place] [address] [phone]      Donnie Solorio MD Orthopedic Surgery Schedule an appointment as soon as possible for a visit  As needed, for follow up [address]  [address]  [facility] [address]  [phone]               Kenan Lara PA-C  09/28/24 1633

## 2024-09-28 NOTE — DISCHARGE INSTRUCTIONS
Thank you for coming to our Emergency Department today. It is important to remember that some problems or medical conditions are difficult to diagnose and may not be found or addressed during your Emergency Department visit.  These conditions often start with non-specific symptoms and can only be diagnosed on follow up visits with your primary care physician or specialist when the symptoms continue or change. Please remember that all medical conditions can change, and we cannot predict how you will be feeling tomorrow or the next day. Return to the ER with any questions/concerns, new/concerning symptoms including fever, chest pain, shortness of breath, loss of consciousness, dizziness, weakness, worsening symptoms, failure to improve, or any other concerns. Also, please follow up with your Primary Care Physician and/or Pediatrician in the next 1-2 days to review your ED visit in entirety and for re-evaluation.   Be sure to follow up with your primary care doctor and review all labs/imaging/tests that were performed during your ER visit with them. It is very common for us to identify non-emergent incidental findings which must be followed up with your primary care physician.  Some labs/imaging/tests may be outside of the normal range, and require non-emergent follow-up and/or further investigation/treatment/procedures/testing to help diagnose/exclude/prevent complications or other potentially serious medical conditions. Some abnormalities may not have been discussed or addressed during your ER visit. Some lab results may not return during your ER visit but can be accessible by downloading the free [facility] lalo or by visiting [email or web address] . It is important for you to review all labs/imaging/tests which are outside of the normal range with your physician.  An ER visit does not replace a primary care visit, and many screening tests or follow-up tests cannot be ordered by an ER doctor or performed by  the ER. Some tests may even require pre-approval.  If you do not have a primary care doctor, you may contact the one listed on your discharge paperwork or you may also call the [facility] Appointment Desk at [phone] , or [address] [place] [address] at  [phone] to schedule an appointment, or establish care with a primary care doctor or even a specialist and to obtain information about local resources. It is important to your health that you have a primary care doctor.  Please take all medications as directed. We have done our best to select a medication for you that will treat your condition however, all medications may potentially have side-effects and it is impossible to predict which medications may give you side-effects or what those side-effects (if any) those medications may give you.  If you feel that you are having a negative effect or side-effect of any medication you should stop taking those medications immediately and seek medical attention. If you feel that you are having a life-threatening reaction call 911.  Do not drive, swim, climb to height, take a bath, operate heavy machinery, drink alcohol or take potentially sedating medications, sign any legal documents or make any important decisions for 24 hours if you have received any pain medications, sedatives or mood altering drugs during your ER visit or within 24 hours of taking them if they have been prescribed to you.   You can find additional resources for Dentists, hearing aids, durable medical equipment, low cost pharmacies and other resources at [email or web address]  Patient agrees with this plan. Discussed with her strict return precautions, they verbalized understanding. Patient is stable for discharge.   § Please take all medication as prescribed.  [facility] - Orthopedics Clinic:  If you would like to follow up with the [facility] for further care of your injury, please call the [facility] Scheduling Department at [phone] during business hours. Please let the  know you need a follow-up appointment for your injury with Orthopedics, and you will be scheduled in the Orthopedic Clinic. Please bring your Discharge Papers with you to the clinic appointment.    Splint: If you were placed in a splint, please keep your splint on and dry until you are seen by Orthopedics and they tell you that you are OK to remove it. Rest and Elevate the extremity to help reduce swelling and pain.   Crutches: If you were discharged home with crutches, use the crutches and DO NOT put any weight on the leg until you are cleared by orthopedics and they tell you that you are OK to walk on it.     For muscular pain please apply a compressive ACE bandage. Rest and elevate the affected painful area.  Apply cold compresses intermittently as needed.  As pain recedes, begin normal activities slowly as tolerated.      If prescribed Lidocaine Patch - Please place over the area of most pain. You can apply it once every 24 hours. Please remove the patch 12 hours after you apply it.   If you are not able to get the prescription Lidoderm Patch, you can try one of the over the counter Lidocaine Patches.     If you have been prescribed Ibuprofen or Tylenol, these medications may be used for pain every 6-8 hours. Do not exceed a dose of 800 mg of Ibuprofen or a dose of 1000 mg of Tylenol in this 6-8 hour period. Please stop taking these medications if you experience: weakness, itching, yellow skin or eyes, joint pains, vomiting blood, blood or black stools, unusual weight gain, or swelling in your arms, legs, hands, or feet.     If you have been prescribed Naproxen for pain. This is an Non-Steroidal Anti-Inflammatory (NSAID) Medication. Please do not take any additional NSAIDs while you are taking this medication including (Advil, Aleve, Motrin, Ibuprofen, Mobic\meloxicam, Naprosyn, Toradol, ketoralac, etc.). Please stop  taking this medication if you experience: weakness, itching, yellow skin or eyes, joint pains, vomiting blood, blood or black stools, unusual weight gain, or swelling in your arms, legs, hands, or feet.     You have been prescribed Robaxin (Methocarbamol) for muscle spasms/pain. Please do not take this medication while working, drinking alcohol, swimming, or while driving/operating heavy machinery. This medication may cause drowsiness, dizziness, impair judgment, and reduce physical capabilities.You should not drive, operate heavy machinery, or make life changing decisions while taking this medication.    If you were prescribed a medication such as Norco or Percocet remember that this medication contains Tylenol. Please do not take any additional Tylenol while you are taking this medication.

## 2025-04-09 ENCOUNTER — TELEPHONE (OUTPATIENT)
Dept: SLEEP MEDICINE | Facility: CLINIC | Age: 35
End: 2025-04-09
Payer: MEDICAID

## 2025-04-09 NOTE — TELEPHONE ENCOUNTER
Lvm to reschedule      ----- Message from Summer sent at 4/9/2025 11:27 AM CDT -----  Regarding: appt  Type:  Patient Returning Call  Name of who is calling:pt  What is request in detail:pt is requesting a call back in regards to appt, pt needs an appt in order to received new CPAP supplies. Pt missed appt last week on 04/03, please assist with new appt if possible.   Can clinic reply by [facility]:no  What number to call back if not in [facility]: [phone]

## 2025-04-23 ENCOUNTER — OFFICE VISIT (OUTPATIENT)
Dept: SLEEP MEDICINE | Facility: CLINIC | Age: 35
End: 2025-04-23
Payer: MEDICAID

## 2025-04-23 VITALS
HEIGHT: 73 IN | DIASTOLIC BLOOD PRESSURE: 93 MMHG | SYSTOLIC BLOOD PRESSURE: 160 MMHG | HEART RATE: 79 BPM | BODY MASS INDEX: 41.75 KG/M2 | WEIGHT: 315 LBS

## 2025-04-23 DIAGNOSIS — G47.09 OTHER INSOMNIA: ICD-10-CM

## 2025-04-23 DIAGNOSIS — G47.33 OSA (OBSTRUCTIVE SLEEP APNEA): Primary | ICD-10-CM

## 2025-04-23 PROCEDURE — 3077F SYST BP >= 140 MM HG: CPT | Mod: CPTII,,, | Performed by: INTERNAL MEDICINE

## 2025-04-23 PROCEDURE — 3080F DIAST BP >= 90 MM HG: CPT | Mod: CPTII,,, | Performed by: INTERNAL MEDICINE

## 2025-04-23 PROCEDURE — 99999 PR PBB SHADOW E&M-EST. PATIENT-LVL III: CPT | Mod: PBBFAC,,, | Performed by: INTERNAL MEDICINE

## 2025-04-23 PROCEDURE — 4010F ACE/ARB THERAPY RXD/TAKEN: CPT | Mod: CPTII,,, | Performed by: INTERNAL MEDICINE

## 2025-04-23 PROCEDURE — 99213 OFFICE O/P EST LOW 20 MIN: CPT | Mod: PBBFAC | Performed by: INTERNAL MEDICINE

## 2025-04-23 PROCEDURE — 3008F BODY MASS INDEX DOCD: CPT | Mod: CPTII,,, | Performed by: INTERNAL MEDICINE

## 2025-04-23 PROCEDURE — G2211 COMPLEX E/M VISIT ADD ON: HCPCS | Mod: S$PBB,,, | Performed by: INTERNAL MEDICINE

## 2025-04-23 PROCEDURE — 99214 OFFICE O/P EST MOD 30 MIN: CPT | Mod: S$PBB,,, | Performed by: INTERNAL MEDICINE

## 2025-04-23 PROCEDURE — 1159F MED LIST DOCD IN RCRD: CPT | Mod: CPTII,,, | Performed by: INTERNAL MEDICINE

## 2025-04-23 NOTE — PROGRESS NOTES
ESTABLISHED PATIENT VISIT     Orville Erwin  is a pleasant 34 y.o. male  established with the [facility] sleep medicine clinic    Here for follow-up    Since last visit:   See interval history in table below    Past Medical History:   Diagnosis Date    Hypertension     Obesity     Sleep apnea      Patient Active Problem List   Diagnosis    Hypertension, essential    BMI 50.0-59.9, adult    CHARLY (obstructive sleep apnea)    Nicotine use disorder    Personal history of nicotine dependence    Asthma    Nonintractable headache    Plantar fasciitis       Current Outpatient Medications:     albuterol (PROVENTIL) 2.5 mg /3 mL (0.083 %) nebulizer solution, Take 3 mLs (2.5 mg total) by nebulization every 6 (six) hours as needed for Wheezing or Shortness of Breath. Rescue, Disp: 120 each, Rfl: 0    albuterol (PROVENTIL/VENTOLIN HFA) 90 mcg/actuation inhaler, Inhale 2 puffs into the lungs every 6 (six) hours as needed for Wheezing or Shortness of Breath., Disp: 18 g, Rfl: 0    cetirizine (ZYRTEC) 10 MG tablet, Take 1 tablet (10 mg total) by mouth once daily., Disp: 30 tablet, Rfl: 0    fluticasone propionate (FLONASE) 50 mcg/actuation nasal spray, 2 sprays (100 mcg total) by Each Nostril route once daily., Disp: 16 g, Rfl: 0    fluticasone propionate (FLONASE) 50 mcg/actuation nasal spray, 1 spray (50 mcg total) by Each Nostril route 2 (two) times daily as needed., Disp: 15 g, Rfl: 0    hydroCHLOROthiazide (HYDRODIURIL) 12.5 MG Tab, Take 12.5 mg by mouth., Disp: , Rfl:     hydrOXYzine pamoate (VISTARIL) 25 MG Cap, Take 1 capsule (25 mg total) by mouth every 8 (eight) hours as needed (anxiety)., Disp: 30 capsule, Rfl: 0    LIDOcaine (LIDODERM) 5 %, Place 1 patch onto the skin once daily. Apply patch for 12 hours and then leave off for 12 hours, Disp: 15 patch, Rfl: 0    lisinopriL (PRINIVIL,ZESTRIL) 20 MG tablet, TAKE 1 TABLET(20 MG) BY MOUTH EVERY DAY, Disp: 30 tablet, Rfl: 0    loratadine (CLARITIN) 10 mg tablet,  "Take 1 tablet (10 mg total) by mouth every morning., Disp: 60 tablet, Rfl: 0    metFORMIN (GLUCOPHAGE-XR) 750 MG ER 24hr tablet, Take 750 mg by mouth., Disp: , Rfl:     naproxen (NAPROSYN) 500 MG tablet, Take 1 tablet (500 mg total) by mouth 2 (two) times daily., Disp: 30 tablet, Rfl: 0    olopatadine (PATANOL) 0.1 % ophthalmic solution, Place 1 drop into both eyes 2 (two) times daily., Disp: 5 mL, Rfl: 0     Vitals:    04/23/25 1527   BP: (!) 160/93   Pulse: 79   Weight: (!) 178 kg (392 lb 6.7 oz)   Height: 6' 1" (1.854 m)      Physical Exam:    GEN:   Well-appearing  Psych:  Appropriate affect, demonstrates insight  SKIN:  No rash on the face or bridge of the nose      LABS:  No results found for: "HGB", "CO2"    RECORDS REVIEWED PREVIOUSLY:  PSG Apr 2011: , titrated to 16cwp +O2    CPAP titration 6/30/22 at [facility]: available in care everywhere: 4-16 cwp, Rx 16cwp    3/1/2023: 30/30 x [id number], CPAP =18, Leak 29/85/113, AHI 9.8 (unk 9.8)      Sig PMH:   HTN, CHARLY dx age 10 (CPAP 15cwp), s/p Tonsillectomy,BMI >50, HTN    with history of prior smoking history, diagnosed CHARLY, scheduled gastric bypass in future, anxiety and hypertension. The patient was diagnosed with CHARLY at 8 years old, per his report, and has been using his CPAP since that time.     PROBLEM DESCRIPTION/ Sx on Presentation Interval Hx STATUS PLAN   Very severe CHARLY   Dx age 8  Has done well on CPAP      PAP history   Dx Study    Mask FFM   DME [facility] (would like to switch back to Access)   My Air    CPAP age 7/21/2022   PAP altn    Benefits    PROBS             LOV: [place] 3.2.23      PAP 3/11/23: mask [place],    17/12cwp + lat REM, 21/16cwp + s REM with occasional breakthrough apneas,   RX= Emin12 push min to 16+ cwp, Ramp fast  -> BiPAP auto E 12, PS 5, max 22    3.23.25: 30/30 x 7h7min, V auto gm 12 (13.8/16.1/16.8), ps 5, max 22, leak 22/89/112, AHI 11.9 (o 2.2, c 0.1, unk 6.8)    Would like pressure higher                  "   controlled         PAP PLAN   E min 12cwp    I max 22 cwp   PS/epr 4   RAMP    Other    Altn. CCL      Press  chg Increase pressure to  16-25, ps 4       -ordered new supplies f(consider climate control line)    The patient is using and benefitting from PAP therapy    CHECKOUT   Recall yearly or sooner if problems arise      DME    Other         Insomnia     SLEEP SCHEDULE   Duration    Wind- down    Envmnt    CBTi    Meds prior    Meds now    Bed Time 10PM-midnight (however variable based upon prn job)   Lights out    Latency 6-8 hours   Arousals    Back to sleep    Stim. 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